# Patient Record
Sex: MALE | Race: BLACK OR AFRICAN AMERICAN | NOT HISPANIC OR LATINO | URBAN - METROPOLITAN AREA
[De-identification: names, ages, dates, MRNs, and addresses within clinical notes are randomized per-mention and may not be internally consistent; named-entity substitution may affect disease eponyms.]

---

## 2019-02-21 PROBLEM — Z00.00 ENCOUNTER FOR PREVENTIVE HEALTH EXAMINATION: Status: ACTIVE | Noted: 2019-02-21

## 2019-03-15 ENCOUNTER — OUTPATIENT (OUTPATIENT)
Dept: OUTPATIENT SERVICES | Facility: HOSPITAL | Age: 59
LOS: 1 days | End: 2019-03-15
Payer: MEDICAID

## 2019-03-15 ENCOUNTER — APPOINTMENT (OUTPATIENT)
Dept: UROLOGY | Facility: CLINIC | Age: 59
End: 2019-03-15

## 2019-03-15 DIAGNOSIS — H40.9 UNSPECIFIED GLAUCOMA: ICD-10-CM

## 2019-03-15 DIAGNOSIS — Z78.9 OTHER SPECIFIED HEALTH STATUS: ICD-10-CM

## 2019-03-15 DIAGNOSIS — E78.5 HYPERLIPIDEMIA, UNSPECIFIED: ICD-10-CM

## 2019-03-15 DIAGNOSIS — I10 ESSENTIAL (PRIMARY) HYPERTENSION: ICD-10-CM

## 2019-03-15 DIAGNOSIS — Z72.0 TOBACCO USE: ICD-10-CM

## 2019-03-15 PROCEDURE — 51798 US URINE CAPACITY MEASURE: CPT

## 2019-03-15 PROCEDURE — G0463: CPT | Mod: 25

## 2019-03-15 RX ORDER — LISINOPRIL 20 MG/1
20 TABLET ORAL
Refills: 0 | Status: ACTIVE | COMMUNITY

## 2019-03-15 RX ORDER — HYDROCHLOROTHIAZIDE 12.5 MG/1
12.5 CAPSULE ORAL
Refills: 0 | Status: ACTIVE | COMMUNITY

## 2019-03-15 RX ORDER — ASPIRIN 81 MG
81 TABLET, DELAYED RELEASE (ENTERIC COATED) ORAL
Refills: 0 | Status: ACTIVE | COMMUNITY

## 2019-03-15 RX ORDER — NIFEDIPINE 60 MG
60 TABLET, EXTENDED RELEASE ORAL
Refills: 0 | Status: ACTIVE | COMMUNITY

## 2019-03-15 RX ORDER — QUETIAPINE 200 MG/1
200 TABLET, FILM COATED ORAL
Refills: 0 | Status: ACTIVE | COMMUNITY

## 2019-03-15 RX ORDER — HALOPERIDOL DECANOATE 100 MG/ML
100 INJECTION INTRAMUSCULAR
Refills: 0 | Status: ACTIVE | COMMUNITY

## 2019-03-15 RX ORDER — SIMVASTATIN 20 MG/1
20 TABLET, FILM COATED ORAL
Refills: 0 | Status: ACTIVE | COMMUNITY

## 2019-03-15 RX ORDER — DIVALPROEX SODIUM 500 MG/1
500 TABLET, EXTENDED RELEASE ORAL
Refills: 0 | Status: ACTIVE | COMMUNITY

## 2019-03-15 RX ORDER — METFORMIN HYDROCHLORIDE 500 MG/1
500 TABLET, COATED ORAL
Refills: 0 | Status: ACTIVE | COMMUNITY

## 2019-03-15 RX ORDER — FENOFIBRATE 145 MG/1
2 TABLET ORAL
Refills: 0 | Status: ACTIVE | COMMUNITY

## 2019-03-15 NOTE — HISTORY OF PRESENT ILLNESS
[FreeTextEntry1] : 58M hx HTN, HLD, DM, MR here for evaluation for incomplete emptying and nocturia. Pt endorses some LUTS for several months now. Denies hesitancy, dribbling, dysuria, hematuria. Endorses incomplete emptying, nocturia x3. Reports urinating 3-4x a day. Denies caffeine and EtOH intake. States he drinks plenty of water. \par \par Denies family hx of prostate CA\par \par outside US 1/2019: 30cc prostate, PVR 26cc\par outside PSA (12/2018): 1.23

## 2019-03-15 NOTE — PHYSICAL EXAM
[General Appearance - Well Nourished] : well nourished [Normal Appearance] : normal appearance [Edema] : no peripheral edema [Abdomen Soft] : soft [Abdomen Tenderness] : non-tender [Abdomen Mass (___ Cm)] : no abdominal mass palpated [Penis Abnormality] : normal circumcised penis [Scrotum] : the scrotum was normal [Epididymis] : the epididymides were normal [Testes Tenderness] : no tenderness of the testes [Testes Mass (___cm)] : there were no testicular masses [Prostate Tenderness] : the prostate was not tender [Prostate Size ___ gm] : prostate size [unfilled] gm [Normal Station and Gait] : the gait and station were normal for the patient's age [] : no rash [No Focal Deficits] : no focal deficits [Affect] : the affect was normal

## 2019-03-15 NOTE — ASSESSMENT
[FreeTextEntry1] : 58M hx HTN, HLD, DM, MR here for evaluation for incomplete emptying and nocturia. On US, 30cc prostate, PVR 26cc, outside PSA (12/2018): 1.23\par -- in office PVR 140cc\par -- mixed irritative and obstructive symptoms (nocturia and incomplete emptying). Pt denies sensation of incomplete emptying, although pt w/hx DM\par -- will trial on Flomax 0.4mg qHS, side effects d/w pt\par -- UA/UCx\par -- RTC 1mo re-eval LUTS

## 2019-03-26 DIAGNOSIS — E11.9 TYPE 2 DIABETES MELLITUS WITHOUT COMPLICATIONS: ICD-10-CM

## 2019-03-26 DIAGNOSIS — R35.1 NOCTURIA: ICD-10-CM

## 2019-03-26 DIAGNOSIS — R39.12 POOR URINARY STREAM: ICD-10-CM

## 2019-03-26 DIAGNOSIS — R33.9 RETENTION OF URINE, UNSPECIFIED: ICD-10-CM

## 2019-03-26 DIAGNOSIS — N40.1 BENIGN PROSTATIC HYPERPLASIA WITH LOWER URINARY TRACT SYMPTOMS: ICD-10-CM

## 2019-04-19 ENCOUNTER — APPOINTMENT (OUTPATIENT)
Dept: UROLOGY | Facility: CLINIC | Age: 59
End: 2019-04-19

## 2019-04-19 ENCOUNTER — OUTPATIENT (OUTPATIENT)
Dept: OUTPATIENT SERVICES | Facility: HOSPITAL | Age: 59
LOS: 1 days | End: 2019-04-19
Payer: MEDICAID

## 2019-04-19 DIAGNOSIS — R35.0 FREQUENCY OF MICTURITION: ICD-10-CM

## 2019-04-19 LAB
APPEARANCE: CLEAR
BACTERIA UR CULT: NORMAL
BACTERIA: NEGATIVE
BILIRUBIN URINE: NEGATIVE
BLOOD URINE: NEGATIVE
COLOR: YELLOW
GLUCOSE QUALITATIVE U: NEGATIVE
HYALINE CASTS: 0 /LPF
KETONES URINE: NEGATIVE
LEUKOCYTE ESTERASE URINE: NEGATIVE
MICROSCOPIC-UA: NORMAL
NITRITE URINE: NEGATIVE
PH URINE: 7
PROTEIN URINE: NORMAL
RED BLOOD CELLS URINE: 7 /HPF
SPECIFIC GRAVITY URINE: 1.02
SQUAMOUS EPITHELIAL CELLS: 6 /HPF
UROBILINOGEN URINE: NORMAL
WHITE BLOOD CELLS URINE: 5 /HPF

## 2019-04-19 PROCEDURE — G0463: CPT | Mod: 25

## 2019-04-19 PROCEDURE — 51798 US URINE CAPACITY MEASURE: CPT

## 2019-04-19 RX ORDER — TAMSULOSIN HYDROCHLORIDE 0.4 MG/1
0.4 CAPSULE ORAL
Qty: 30 | Refills: 6 | Status: ACTIVE | COMMUNITY
Start: 2019-03-15 | End: 1900-01-01

## 2019-04-19 NOTE — HISTORY OF PRESENT ILLNESS
[Urinary Frequency] : urinary frequency [Nocturia] : nocturia [Urinary Urgency] : urinary urgency [Hematuria - Microscopic] : microscopic hematuria [FreeTextEntry1] : 58M hx HTN, HLD, DM, MR here for evaluation for incomplete emptying and nocturia. Pt endorses some LUTS for several months now. Denies hesitancy, dribbling, dysuria, hematuria. Endorses incomplete emptying, nocturia x3. Reports urinating 3-4x a day. Denies caffeine and EtOH intake. States he drinks plenty of water. \par \par Denies family hx of prostate CA\par \par outside US 1/2019: 30cc prostate, PVR 26cc\par outside PSA (12/2018): 1.23\par \par Interval events:\par 4/19 -> Flomax markedly helping urinary symptoms.  Urination much less frequently, and feels like emptying bladder.  PVR today 40 cc.  Found to have microscopic hematuria on UA, had a recent CT scan on 3/19, will bring in results next visit. [Urinary Retention] : no urinary retention [Straining] : no straining [Fatigue] : no fatigue [Nausea] : no nausea

## 2019-04-19 NOTE — PHYSICAL EXAM
[Abdomen Soft] : soft [General Appearance - Well Nourished] : well nourished [Normal Appearance] : normal appearance [Abdomen Mass (___ Cm)] : no abdominal mass palpated [Abdomen Tenderness] : non-tender [Edema] : no peripheral edema [] : no respiratory distress [Affect] : the affect was normal [No Focal Deficits] : no focal deficits [Normal Station and Gait] : the gait and station were normal for the patient's age

## 2019-04-19 NOTE — ASSESSMENT
[FreeTextEntry1] : 58M hx HTN, HLD, DM, MR here for evaluation for incomplete emptying and nocturia. On US, 30cc prostate, PVR 26cc, outside PSA (12/2018): 1.23\par \par -- in office PVR 40 cc on 4/19\par -- Renew Flomax 0.4mg qHS\par -- Return to clinic in 1 month for cystoscopy for microscopic hematuria\par -- Will review patient's recent CT scan from 3/19 with and without contrast for evaluation of upper tracts

## 2019-04-29 DIAGNOSIS — R31.29 OTHER MICROSCOPIC HEMATURIA: ICD-10-CM

## 2019-04-29 DIAGNOSIS — R33.9 RETENTION OF URINE, UNSPECIFIED: ICD-10-CM

## 2019-04-29 DIAGNOSIS — R39.12 POOR URINARY STREAM: ICD-10-CM

## 2019-04-29 DIAGNOSIS — R35.1 NOCTURIA: ICD-10-CM

## 2019-04-29 DIAGNOSIS — R39.15 URGENCY OF URINATION: ICD-10-CM

## 2019-04-29 DIAGNOSIS — E11.9 TYPE 2 DIABETES MELLITUS WITHOUT COMPLICATIONS: ICD-10-CM

## 2019-05-09 ENCOUNTER — RESULT REVIEW (OUTPATIENT)
Age: 59
End: 2019-05-09

## 2019-05-10 ENCOUNTER — OUTPATIENT (OUTPATIENT)
Dept: OUTPATIENT SERVICES | Facility: HOSPITAL | Age: 59
LOS: 1 days | End: 2019-05-10
Payer: MEDICAID

## 2019-05-10 ENCOUNTER — APPOINTMENT (OUTPATIENT)
Dept: UROLOGY | Facility: CLINIC | Age: 59
End: 2019-05-10

## 2019-05-10 DIAGNOSIS — R35.0 FREQUENCY OF MICTURITION: ICD-10-CM

## 2019-05-10 PROCEDURE — 88112 CYTOPATH CELL ENHANCE TECH: CPT | Mod: 26

## 2019-05-10 PROCEDURE — 88112 CYTOPATH CELL ENHANCE TECH: CPT

## 2019-05-10 PROCEDURE — G0463: CPT | Mod: 25

## 2019-05-10 PROCEDURE — 52000 CYSTOURETHROSCOPY: CPT

## 2019-05-10 RX ORDER — FINASTERIDE 5 MG/1
5 TABLET, FILM COATED ORAL DAILY
Qty: 30 | Refills: 5 | Status: ACTIVE | COMMUNITY
Start: 2019-05-10 | End: 1900-01-01

## 2019-05-10 RX ORDER — TAMSULOSIN HYDROCHLORIDE 0.4 MG/1
0.4 CAPSULE ORAL
Qty: 30 | Refills: 5 | Status: ACTIVE | COMMUNITY
Start: 2019-05-10 | End: 1900-01-01

## 2019-05-16 DIAGNOSIS — R31.29 OTHER MICROSCOPIC HEMATURIA: ICD-10-CM

## 2019-08-16 ENCOUNTER — OUTPATIENT (OUTPATIENT)
Dept: OUTPATIENT SERVICES | Facility: HOSPITAL | Age: 59
LOS: 1 days | End: 2019-08-16
Payer: MEDICAID

## 2019-08-16 ENCOUNTER — APPOINTMENT (OUTPATIENT)
Dept: UROLOGY | Facility: CLINIC | Age: 59
End: 2019-08-16

## 2019-08-16 DIAGNOSIS — R30.0 DYSURIA: ICD-10-CM

## 2019-08-16 DIAGNOSIS — R31.0 GROSS HEMATURIA: ICD-10-CM

## 2019-08-16 DIAGNOSIS — Z87.440 PERSONAL HISTORY OF URINARY (TRACT) INFECTIONS: ICD-10-CM

## 2019-08-16 PROCEDURE — G0463: CPT

## 2019-08-16 RX ORDER — CEPHALEXIN 500 MG/1
500 CAPSULE ORAL TWICE DAILY
Qty: 10 | Refills: 0 | Status: ACTIVE | COMMUNITY
Start: 2019-08-16 | End: 1900-01-01

## 2019-08-16 NOTE — PHYSICAL EXAM
[General Appearance - In No Acute Distress] : no acute distress [General Appearance - Well Nourished] : well nourished [Normal Appearance] : normal appearance [Abdomen Soft] : soft [Abdomen Tenderness] : non-tender [Abdomen Mass (___ Cm)] : no abdominal mass palpated [Penis Abnormality] : normal circumcised penis [Urinary Bladder Findings] : the bladder was normal on palpation [Epididymis] : the epididymides were normal [Testes Mass (___cm)] : there were no testicular masses [Testes Tenderness] : no tenderness of the testes [] : no rash [Edema] : no peripheral edema [Exaggerated Use Of Accessory Muscles For Inspiration] : no accessory muscle use [Affect] : the affect was normal [Normal Station and Gait] : the gait and station were normal for the patient's age [No Focal Deficits] : no focal deficits

## 2019-08-16 NOTE — HISTORY OF PRESENT ILLNESS
[Urinary Urgency] : urinary urgency [Nocturia] : nocturia [Urinary Frequency] : urinary frequency [Hematuria - Gross] : gross hematuria [Urinary Retention] : no urinary retention [FreeTextEntry1] : 58M hx HTN, HLD, DM, MR followed in the clinic for microscopic hematuria.  Recent work up included a negative CT urogram, negative urine cytology, and a cystoscopy only showing an enlarged prostate.  \par \par Patient was set to RTC in November, but presents for f/u from recent hospitalization at [facility] on 8/3/19.  From caretaker, patient fell and was hypotensive.  CT scan obtained at [facility] that showed 400 cc in bladder, mildly enlarged prostate, and evidence of an esophagitis s/p full upper and lower endoscopy.  Endoscopy just revealed esophagitis.  Had a marquez placed in the hospital for the retention but was subsequently removed prior to discharge.  Since then, has been voiding more frequently than usual and having noticeable hematuria as appreciable from her caretakers.\par \par PVR today 40-60 cc [Hematuria - Microscopic] : no microscopic hematuria [Straining] : no straining [Fever] : no fever [Fatigue] : no fatigue [Nausea] : no nausea

## 2019-08-16 NOTE — ASSESSMENT
[Urinary Tract Infection (599.0\N39.0)] : qualitative ~C was positive [FreeTextEntry1] : 58M hx HTN, HLD, DM, MR followed in the clinic for microscopic hematuria.  Recent work up included a negative CT urogram, negative urine cytology, and a cystoscopy only showing an enlarged prostate.  \par \par Presents for f/u from recent hospitalization at [facility] on 8/3/19.  From caretaker, patient fell and was hypotensive.  CT scan obtained at [facility] that showed 400 cc in bladder, mildly enlarged prostate, and evidence of an esophagitis s/p full upper and lower endoscopy.  Endoscopy just revealed esophagitis.  Had a marquez placed in the hospital for the retention but was subsequently removed prior to discharge. Since then, has been voiding more frequently than usual and having noticeable hematuria as appreciable from her caretakers.\par \par Urine today is light pink in color.  Suspect likely UTI related to recent catheterization and hematuria either due to irritation from BPH and catheter vs UTI.\par \par -- UA, UCx today\par -- Rx Keflex for 1 week\par -- RTC in 1 wk for re-assesment

## 2019-08-19 LAB
APPEARANCE: CLEAR
BACTERIA UR CULT: NORMAL
BACTERIA: NEGATIVE
BILIRUBIN URINE: NEGATIVE
BLOOD URINE: ABNORMAL
COLOR: ABNORMAL
GLUCOSE QUALITATIVE U: NEGATIVE
HYALINE CASTS: 0 /LPF
KETONES URINE: NEGATIVE
LEUKOCYTE ESTERASE URINE: NEGATIVE
MICROSCOPIC-UA: NORMAL
NITRITE URINE: NEGATIVE
PH URINE: 7.5
PROTEIN URINE: ABNORMAL
RED BLOOD CELLS URINE: 2 /HPF
SPECIFIC GRAVITY URINE: 1.02
SQUAMOUS EPITHELIAL CELLS: 3 /HPF
UROBILINOGEN URINE: NORMAL
WHITE BLOOD CELLS URINE: 1 /HPF

## 2019-09-03 DIAGNOSIS — R31.0 GROSS HEMATURIA: ICD-10-CM

## 2019-09-03 DIAGNOSIS — N39.0 URINARY TRACT INFECTION, SITE NOT SPECIFIED: ICD-10-CM

## 2019-09-03 DIAGNOSIS — R35.0 FREQUENCY OF MICTURITION: ICD-10-CM

## 2019-09-06 ENCOUNTER — APPOINTMENT (OUTPATIENT)
Dept: UROLOGY | Facility: CLINIC | Age: 59
End: 2019-09-06

## 2019-09-06 ENCOUNTER — OUTPATIENT (OUTPATIENT)
Dept: OUTPATIENT SERVICES | Facility: HOSPITAL | Age: 59
LOS: 1 days | End: 2019-09-06
Payer: MEDICAID

## 2019-09-06 DIAGNOSIS — E11.9 TYPE 2 DIABETES MELLITUS W/OUT COMPLICATIONS: ICD-10-CM

## 2019-09-06 DIAGNOSIS — N39.0 URINARY TRACT INFECTION, SITE NOT SPECIFIED: ICD-10-CM

## 2019-09-06 DIAGNOSIS — R35.0 FREQUENCY OF MICTURITION: ICD-10-CM

## 2019-09-06 DIAGNOSIS — E11.9 TYPE 2 DIABETES MELLITUS WITHOUT COMPLICATIONS: ICD-10-CM

## 2019-09-06 DIAGNOSIS — N40.0 BENIGN PROSTATIC HYPERPLASIA WITHOUT LOWER URINARY TRACT SYMPTOMS: ICD-10-CM

## 2019-09-06 PROCEDURE — 51798 US URINE CAPACITY MEASURE: CPT

## 2019-09-06 PROCEDURE — G0463: CPT | Mod: 25

## 2019-09-06 NOTE — PHYSICAL EXAM
[General Appearance - Well Developed] : well developed [Normal Appearance] : normal appearance [General Appearance - Well Nourished] : well nourished [Well Groomed] : well groomed [Abdomen Soft] : soft [General Appearance - In No Acute Distress] : no acute distress [Costovertebral Angle Tenderness] : no ~M costovertebral angle tenderness [Abdomen Tenderness] : non-tender [] : no respiratory distress [Edema] : no peripheral edema [Exaggerated Use Of Accessory Muscles For Inspiration] : no accessory muscle use [Respiration, Rhythm And Depth] : normal respiratory rhythm and effort [Affect] : the affect was normal [Mood] : the mood was normal [Oriented To Time, Place, And Person] : oriented to person, place, and time [Normal Station and Gait] : the gait and station were normal for the patient's age [Not Anxious] : not anxious [No Focal Deficits] : no focal deficits [No Palpable Adenopathy] : no palpable adenopathy

## 2019-09-06 NOTE — ASSESSMENT
[FreeTextEntry1] : Pt is a 59 yo male with improving urinary symptoms following keflex treatment, urine culture was negative.\par - Patient to follow up in Nov for repeat UA.\par - If patient unable to void, patient to go to ER for evaluation.

## 2019-09-06 NOTE — HISTORY OF PRESENT ILLNESS
[FreeTextEntry1] : Patient seen in clinic 3 weeks prior for urinary frequency and urgency following catherization during hosptial stay. Patient states symptoms have improved since taking keflex and denies current urinary symptoms. Patient voided urine today is clear yellow without any evidence of bleeding. \par Patient has a history of microscopic hematuria. \par \par  \par \par PVR today 80 cc

## 2019-09-09 ENCOUNTER — APPOINTMENT (OUTPATIENT)
Dept: PULMONOLOGY | Facility: CLINIC | Age: 59
End: 2019-09-09

## 2019-10-11 ENCOUNTER — OUTPATIENT (OUTPATIENT)
Dept: OUTPATIENT SERVICES | Facility: HOSPITAL | Age: 59
LOS: 1 days | End: 2019-10-11
Payer: MEDICAID

## 2019-10-11 ENCOUNTER — APPOINTMENT (OUTPATIENT)
Dept: UROLOGY | Facility: CLINIC | Age: 59
End: 2019-10-11

## 2019-10-11 DIAGNOSIS — R35.0 FREQUENCY OF MICTURITION: ICD-10-CM

## 2019-10-11 DIAGNOSIS — N40.0 BENIGN PROSTATIC HYPERPLASIA WITHOUT LOWER URINARY TRACT SYMPMS: ICD-10-CM

## 2019-10-11 DIAGNOSIS — R33.9 RETENTION OF URINE, UNSPECIFIED: ICD-10-CM

## 2019-10-11 DIAGNOSIS — R31.29 OTHER MICROSCOPIC HEMATURIA: ICD-10-CM

## 2019-10-11 DIAGNOSIS — R35.1 NOCTURIA: ICD-10-CM

## 2019-10-11 DIAGNOSIS — N40.0 BENIGN PROSTATIC HYPERPLASIA WITHOUT LOWER URINARY TRACT SYMPTOMS: ICD-10-CM

## 2019-10-11 DIAGNOSIS — R39.15 URGENCY OF URINATION: ICD-10-CM

## 2019-10-11 DIAGNOSIS — R39.12 POOR URINARY STREAM: ICD-10-CM

## 2019-10-11 PROCEDURE — G0463: CPT

## 2019-10-11 NOTE — PHYSICAL EXAM
[General Appearance - Well Developed] : well developed [General Appearance - Well Nourished] : well nourished [General Appearance - In No Acute Distress] : no acute distress [Abdomen Soft] : soft [Abdomen Tenderness] : non-tender [Costovertebral Angle Tenderness] : no ~M costovertebral angle tenderness [Urethral Meatus] : meatus normal [Urinary Bladder Findings] : the bladder was normal on palpation [Scrotum] : the scrotum was normal [Testes Mass (___cm)] : there were no testicular masses [Skin Color & Pigmentation] : normal skin color and pigmentation [] : no respiratory distress [No Palpable Adenopathy] : no palpable adenopathy [Inguinal Lymph Nodes Enlarged Bilaterally] : inguinal

## 2019-10-14 LAB
APPEARANCE: CLEAR
BACTERIA: NEGATIVE
BILIRUBIN URINE: NEGATIVE
BLOOD URINE: NEGATIVE
COLOR: YELLOW
GLUCOSE QUALITATIVE U: NEGATIVE
HYALINE CASTS: 0 /LPF
KETONES URINE: NEGATIVE
LEUKOCYTE ESTERASE URINE: NEGATIVE
MICROSCOPIC-UA: NORMAL
NITRITE URINE: NEGATIVE
PH URINE: 7
PROTEIN URINE: NEGATIVE
RED BLOOD CELLS URINE: 1 /HPF
SPECIFIC GRAVITY URINE: 1.02
SQUAMOUS EPITHELIAL CELLS: 1 /HPF
UROBILINOGEN URINE: ABNORMAL
WHITE BLOOD CELLS URINE: 1 /HPF

## 2019-10-14 NOTE — HISTORY OF PRESENT ILLNESS
[FreeTextEntry1] : this is a 60 y/o male here for f/u for urinary sx and repeat UA.  Pt last seen sept 6th 2019 and pt had sx resolved from taking keflex.  pt has nocturia x3, urgency, not strong stream but "ok", no incomplete emptying, no frequency, dysuria, hematuria.  Pt reports weight gain, no SOB, no f/c, has regular bowel movements, happy with urinary sx now. Otherwise feeling well.\par \par

## 2019-10-14 NOTE — ASSESSMENT
[FreeTextEntry1] : his is a 58 y/o male here for f/u for urinary sx and repeat UA\par \par RTC in 6 mo

## 2019-10-14 NOTE — REVIEW OF SYSTEMS
[see HPI] : see HPI [Nocturia] : nocturia [Negative] : Constitutional [Fever] : no fever [Chills] : no chills [Feeling Poorly] : not feeling poorly [Feeling Tired] : not feeling tired [Chest Pain] : no chest pain [Palpitations] : no palpitations [Shortness Of Breath] : no shortness of breath [Cough] : no cough [Wheezing] : no wheezing [SOB on Exertion] : no shortness of breath during exertion [Abdominal Pain] : no abdominal pain [Vomiting] : no vomiting [Constipation] : no constipation [Dysuria] : no dysuria [Incontinence] : no incontinence [Hesitancy] : no urinary hesitancy [Genital Lesion] : no genital lesions [Testicular Pain] : no testicular pain

## 2020-02-07 DIAGNOSIS — G89.29 PAIN IN RIGHT KNEE: ICD-10-CM

## 2020-02-07 DIAGNOSIS — M25.561 PAIN IN RIGHT KNEE: ICD-10-CM

## 2020-02-12 ENCOUNTER — OUTPATIENT (OUTPATIENT)
Dept: OUTPATIENT SERVICES | Facility: HOSPITAL | Age: 60
LOS: 1 days | End: 2020-02-12

## 2020-02-12 ENCOUNTER — APPOINTMENT (OUTPATIENT)
Dept: ORTHOPEDIC SURGERY | Facility: HOSPITAL | Age: 60
End: 2020-02-12

## 2020-02-12 VITALS
DIASTOLIC BLOOD PRESSURE: 78 MMHG | HEART RATE: 72 BPM | BODY MASS INDEX: 25.15 KG/M2 | WEIGHT: 196 LBS | SYSTOLIC BLOOD PRESSURE: 119 MMHG | HEIGHT: 74 IN

## 2020-02-12 DIAGNOSIS — G95.9 DISEASE OF SPINAL CORD, UNSPECIFIED: ICD-10-CM

## 2020-02-13 DIAGNOSIS — G25.9 EXTRAPYRAMIDAL AND MOVEMENT DISORDER, UNSPECIFIED: ICD-10-CM

## 2020-02-13 DIAGNOSIS — M79.604 PAIN IN RIGHT LEG: ICD-10-CM

## 2020-05-05 RX ORDER — FINASTERIDE 5 MG/1
5 TABLET, FILM COATED ORAL DAILY
Qty: 30 | Refills: 1 | Status: ACTIVE | COMMUNITY
Start: 2019-05-10 | End: 1900-01-01

## 2020-05-05 RX ORDER — TAMSULOSIN HYDROCHLORIDE 0.4 MG/1
0.4 CAPSULE ORAL
Qty: 30 | Refills: 1 | Status: ACTIVE | COMMUNITY
Start: 2019-05-10 | End: 1900-01-01

## 2020-06-05 ENCOUNTER — APPOINTMENT (OUTPATIENT)
Dept: UROLOGY | Facility: CLINIC | Age: 60
End: 2020-06-05

## 2020-12-21 PROBLEM — Z87.440 HISTORY OF URINARY TRACT INFECTION: Status: RESOLVED | Noted: 2019-08-16 | Resolved: 2020-12-21

## 2022-02-11 ENCOUNTER — OUTPATIENT (OUTPATIENT)
Dept: OUTPATIENT SERVICES | Facility: HOSPITAL | Age: 62
LOS: 1 days | End: 2022-02-11
Payer: MEDICAID

## 2022-02-11 VITALS
RESPIRATION RATE: 16 BRPM | WEIGHT: 177.03 LBS | HEART RATE: 65 BPM | SYSTOLIC BLOOD PRESSURE: 158 MMHG | DIASTOLIC BLOOD PRESSURE: 83 MMHG | TEMPERATURE: 98 F | HEIGHT: 74 IN | OXYGEN SATURATION: 99 %

## 2022-02-11 DIAGNOSIS — K05.6 PERIODONTAL DISEASE, UNSPECIFIED: ICD-10-CM

## 2022-02-11 DIAGNOSIS — K02.62 DENTAL CARIES ON SMOOTH SURFACE PENETRATING INTO DENTIN: ICD-10-CM

## 2022-02-11 DIAGNOSIS — Z98.890 OTHER SPECIFIED POSTPROCEDURAL STATES: Chronic | ICD-10-CM

## 2022-02-11 DIAGNOSIS — Z01.818 ENCOUNTER FOR OTHER PREPROCEDURAL EXAMINATION: ICD-10-CM

## 2022-02-11 LAB
ANION GAP SERPL CALC-SCNC: 14 MMOL/L — SIGNIFICANT CHANGE UP (ref 5–17)
BUN SERPL-MCNC: 8 MG/DL — SIGNIFICANT CHANGE UP (ref 7–23)
CALCIUM SERPL-MCNC: 9.1 MG/DL — SIGNIFICANT CHANGE UP (ref 8.4–10.5)
CHLORIDE SERPL-SCNC: 97 MMOL/L — SIGNIFICANT CHANGE UP (ref 96–108)
CO2 SERPL-SCNC: 23 MMOL/L — SIGNIFICANT CHANGE UP (ref 22–31)
CREAT SERPL-MCNC: 0.9 MG/DL — SIGNIFICANT CHANGE UP (ref 0.5–1.3)
GLUCOSE SERPL-MCNC: 83 MG/DL — SIGNIFICANT CHANGE UP (ref 70–99)
HCT VFR BLD CALC: 32.9 % — LOW (ref 39–50)
HGB BLD-MCNC: 10.7 G/DL — LOW (ref 13–17)
MCHC RBC-ENTMCNC: 25.3 PG — LOW (ref 27–34)
MCHC RBC-ENTMCNC: 32.5 GM/DL — SIGNIFICANT CHANGE UP (ref 32–36)
MCV RBC AUTO: 77.8 FL — LOW (ref 80–100)
NRBC # BLD: 0 /100 WBCS — SIGNIFICANT CHANGE UP (ref 0–0)
PLATELET # BLD AUTO: 89 K/UL — LOW (ref 150–400)
POTASSIUM SERPL-MCNC: 3.6 MMOL/L — SIGNIFICANT CHANGE UP (ref 3.5–5.3)
POTASSIUM SERPL-SCNC: 3.6 MMOL/L — SIGNIFICANT CHANGE UP (ref 3.5–5.3)
RBC # BLD: 4.23 M/UL — SIGNIFICANT CHANGE UP (ref 4.2–5.8)
RBC # FLD: 15.9 % — HIGH (ref 10.3–14.5)
SODIUM SERPL-SCNC: 134 MMOL/L — LOW (ref 135–145)
WBC # BLD: 5.27 K/UL — SIGNIFICANT CHANGE UP (ref 3.8–10.5)
WBC # FLD AUTO: 5.27 K/UL — SIGNIFICANT CHANGE UP (ref 3.8–10.5)

## 2022-02-11 PROCEDURE — 80048 BASIC METABOLIC PNL TOTAL CA: CPT

## 2022-02-11 PROCEDURE — G0463: CPT

## 2022-02-11 PROCEDURE — 85027 COMPLETE CBC AUTOMATED: CPT

## 2022-02-11 PROCEDURE — 36415 COLL VENOUS BLD VENIPUNCTURE: CPT

## 2022-02-11 RX ORDER — SODIUM CHLORIDE 9 MG/ML
1000 INJECTION, SOLUTION INTRAVENOUS
Refills: 0 | Status: DISCONTINUED | OUTPATIENT
Start: 2022-02-25 | End: 2022-03-11

## 2022-02-11 RX ORDER — SODIUM CHLORIDE 9 MG/ML
3 INJECTION INTRAMUSCULAR; INTRAVENOUS; SUBCUTANEOUS EVERY 8 HOURS
Refills: 0 | Status: DISCONTINUED | OUTPATIENT
Start: 2022-02-25 | End: 2022-03-11

## 2022-02-11 NOTE — H&P PST ADULT - PRO ARRIVE FROM
[facility] (lives in support apartment)--accompanied by medical coordinator/home/group home [facility] (lives in supportive apartment)--accompanied by medical coordinator/home/group home

## 2022-02-11 NOTE — H&P PST ADULT - NSANTHOSAYNRD_GEN_A_CORE
No. RAYMUNDO screening performed.  STOP BANG Legend: 0-2 = LOW Risk; 3-4 = INTERMEDIATE Risk; 5-8 = HIGH Risk

## 2022-02-11 NOTE — H&P PST ADULT - FALL HARM RISK - UNIVERSAL INTERVENTIONS
Bed in lowest position, wheels locked, appropriate side rails in place/Call bell, personal items and telephone in reach/Instruct patient to call for assistance before getting out of bed or chair/Non-slip footwear when patient is out of bed/[place] to call system/Physically safe environment - no spills, clutter or unnecessary equipment/Purposeful Proactive Rounding/Room/bathroom lighting operational, light cord in reach

## 2022-02-11 NOTE — H&P PST ADULT - NSICDXPASTMEDICALHX_GEN_ALL_CORE_FT
PAST MEDICAL HISTORY:  Autism     H/O: glaucoma     History of BPH     HLD (hyperlipidemia)     HTN (hypertension)      PAST MEDICAL HISTORY:  Autism     Current every day smoker     H/O: glaucoma     History of BPH     HLD (hyperlipidemia)     HTN (hypertension)

## 2022-02-11 NOTE — H&P PST ADULT - HISTORY OF PRESENT ILLNESS
61 year old male with PMH HTN, T2DM, HLD, DM Neuropathy, Glaucoma, BPH, Autism    preop covid swab 2/23 @ [facility] 61 year old male with PMH of current tobacco use, HTN, HLD, Glaucoma, BPH, Autism, with Periodontal Disease who presents to [facility] for evaluation prior to scheduled Comprehensive Dental Treatment under Anesthesia 2/25/2022.    preop covid swab 2/23 1:30 @ [facility] (booked at [facility])

## 2022-02-11 NOTE — H&P PST ADULT - PROBLEM SELECTOR PLAN 1
Comprehensive Dental Treatement under Anesthesia  -cbc, bmp done at [facility] [place]  -medical evaluation  -preop instructions given to group home staff member Comprehensive Dental Treatement under Anesthesia  -cbc, bmp done at [facility]  -medical evaluation  -advised to avoid smoking day of surgery  -preop instructions given to group home staff member

## 2022-02-11 NOTE — H&P PST ADULT - NEUROLOGICAL DETAILS
responds to pain/responds to verbal commands alert and oriented x 3/responds to pain/responds to verbal commands

## 2022-02-17 PROBLEM — Z86.69 PERSONAL HISTORY OF OTHER DISEASES OF THE NERVOUS SYSTEM AND SENSE ORGANS: Chronic | Status: ACTIVE | Noted: 2022-02-11

## 2022-02-17 PROBLEM — E78.5 HYPERLIPIDEMIA, UNSPECIFIED: Chronic | Status: ACTIVE | Noted: 2022-02-11

## 2022-02-17 PROBLEM — I10 ESSENTIAL (PRIMARY) HYPERTENSION: Chronic | Status: ACTIVE | Noted: 2022-02-11

## 2022-02-17 PROBLEM — F17.200 NICOTINE DEPENDENCE, UNSPECIFIED, UNCOMPLICATED: Chronic | Status: ACTIVE | Noted: 2022-02-11

## 2022-02-17 PROBLEM — F84.0 AUTISTIC DISORDER: Chronic | Status: ACTIVE | Noted: 2022-02-11

## 2022-02-17 PROBLEM — Z87.438 PERSONAL HISTORY OF OTHER DISEASES OF MALE GENITAL ORGANS: Chronic | Status: ACTIVE | Noted: 2022-02-11

## 2022-02-23 ENCOUNTER — OUTPATIENT (OUTPATIENT)
Dept: OUTPATIENT SERVICES | Facility: HOSPITAL | Age: 62
LOS: 1 days | End: 2022-02-23
Payer: MEDICAID

## 2022-02-23 DIAGNOSIS — Z11.52 ENCOUNTER FOR SCREENING FOR COVID-19: ICD-10-CM

## 2022-02-23 DIAGNOSIS — Z98.890 OTHER SPECIFIED POSTPROCEDURAL STATES: Chronic | ICD-10-CM

## 2022-02-23 LAB — SARS-COV-2 RNA SPEC QL NAA+PROBE: SIGNIFICANT CHANGE UP

## 2022-02-23 PROCEDURE — U0005: CPT

## 2022-02-23 PROCEDURE — U0003: CPT

## 2022-02-23 PROCEDURE — C9803: CPT

## 2022-02-24 ENCOUNTER — TRANSCRIPTION ENCOUNTER (OUTPATIENT)
Age: 62
End: 2022-02-24

## 2022-02-25 ENCOUNTER — OUTPATIENT (OUTPATIENT)
Dept: OUTPATIENT SERVICES | Facility: HOSPITAL | Age: 62
LOS: 1 days | End: 2022-02-25
Payer: MEDICAID

## 2022-02-25 VITALS
TEMPERATURE: 97 F | HEART RATE: 69 BPM | RESPIRATION RATE: 16 BRPM | OXYGEN SATURATION: 100 % | WEIGHT: 177.03 LBS | DIASTOLIC BLOOD PRESSURE: 84 MMHG | SYSTOLIC BLOOD PRESSURE: 152 MMHG | HEIGHT: 74 IN

## 2022-02-25 VITALS
RESPIRATION RATE: 18 BRPM | HEART RATE: 65 BPM | SYSTOLIC BLOOD PRESSURE: 162 MMHG | OXYGEN SATURATION: 100 % | TEMPERATURE: 98 F | DIASTOLIC BLOOD PRESSURE: 96 MMHG

## 2022-02-25 DIAGNOSIS — Z01.818 ENCOUNTER FOR OTHER PREPROCEDURAL EXAMINATION: ICD-10-CM

## 2022-02-25 DIAGNOSIS — K02.62 DENTAL CARIES ON SMOOTH SURFACE PENETRATING INTO DENTIN: ICD-10-CM

## 2022-02-25 DIAGNOSIS — Z98.890 OTHER SPECIFIED POSTPROCEDURAL STATES: Chronic | ICD-10-CM

## 2022-02-25 DIAGNOSIS — K05.6 PERIODONTAL DISEASE, UNSPECIFIED: ICD-10-CM

## 2022-02-25 PROCEDURE — 41874 ALVEOLOPLASTY EACH QUADRANT: CPT

## 2022-02-25 PROCEDURE — D7210: CPT

## 2022-02-25 PROCEDURE — C1889: CPT

## 2022-02-25 DEVICE — SURGICEL 2 X 14": Type: IMPLANTABLE DEVICE | Status: FUNCTIONAL

## 2022-02-25 RX ORDER — ONDANSETRON 8 MG/1
4 TABLET, FILM COATED ORAL ONCE
Refills: 0 | Status: DISCONTINUED | OUTPATIENT
Start: 2022-02-25 | End: 2022-03-11

## 2022-02-25 RX ORDER — FENTANYL CITRATE 50 UG/ML
25 INJECTION INTRAVENOUS
Refills: 0 | Status: DISCONTINUED | OUTPATIENT
Start: 2022-02-25 | End: 2022-02-25

## 2022-02-25 RX ADMIN — SODIUM CHLORIDE 100 MILLILITER(S): 9 INJECTION, SOLUTION INTRAVENOUS at 13:28

## 2022-02-25 RX ADMIN — SODIUM CHLORIDE 3 MILLILITER(S): 9 INJECTION INTRAMUSCULAR; INTRAVENOUS; SUBCUTANEOUS at 13:29

## 2022-02-25 NOTE — ASU DISCHARGE PLAN (ADULT/PEDIATRIC) - NURSING INSTRUCTIONS
Next dose of Tylenol will be on or after _______8:30pm____ ,today/tonight and every 6 hours afterwards for pain management, do not take any Tylenol containing products until this time. Your first dose of Tylenol was given at ______2:30pm_____. Do not exceed more than 4000mg of Tylenol in one 24 hour setting. No straws for 2 days. Eat soft cold food; no seeds/no rice. No smoking.     Next dose of Tylenol will be on or after _______8:30pm____ ,today/tonight and every 6 hours afterwards for pain management, do not take any Tylenol containing products until this time. Your first dose of Tylenol was given at ______2:30pm_____. Do not exceed more than 4000mg of Tylenol in one 24 hour setting.

## 2022-02-25 NOTE — ASU PATIENT PROFILE, ADULT - FALL HARM RISK - UNIVERSAL INTERVENTIONS
Bed in lowest position, wheels locked, appropriate side rails in place/Call bell, personal items and telephone in reach/Instruct patient to call for assistance before getting out of bed or chair/Non-slip footwear when patient is out of bed/[place] to call system/Physically safe environment - no spills, clutter or unnecessary equipment/Purposeful Proactive Rounding/Room/bathroom lighting operational, light cord in reach

## 2022-02-25 NOTE — ASU DISCHARGE PLAN (ADULT/PEDIATRIC) - NS MD DC FALL RISK RISK
For information on Fall & Injury Prevention, visit: [email or web address] OR  [email or web address] OR  https://www.cdc.gov/steadi/patient.html

## 2022-02-25 NOTE — ASU PATIENT PROFILE, ADULT - PRO ARRIVE FROM
[facility] (lives in supportive apartment)--accompanied by medical coordinator/home/group home [facility] (lives in supportive apartment)--accompanied by medical coordinator/assisted living facility

## 2022-02-25 NOTE — ASU DISCHARGE PLAN (ADULT/PEDIATRIC) - ASU DC SPECIAL INSTRUCTIONSFT
no straw for two days and keep head elevated for the next two days and nights     see Dr Julio in one week on 3/1 at 10:45 [place] [facility] [place], appointment is made if you need to change it call [phone]    resume all medications and return to program on Sunday    tylenol prn pain no straw for two days and keep head elevated for the next two days and nights     extractions performed as medically necessary     clindamycin was prescribed from DR Narayan office     see Dr Julio in one week on 3/1 at 10:45 at [facility] in [place], appointment is made if you need to change it call [phone]    resume all medications and return to program on Sunday    tylenol prn pain

## 2022-02-25 NOTE — ASU PATIENT PROFILE, ADULT - NSICDXPASTMEDICALHX_GEN_ALL_CORE_FT
PAST MEDICAL HISTORY:  Autism     Current every day smoker     H/O: glaucoma     History of BPH     HLD (hyperlipidemia)     HTN (hypertension)

## 2022-09-02 ENCOUNTER — OUTPATIENT (OUTPATIENT)
Dept: OUTPATIENT SERVICES | Facility: HOSPITAL | Age: 62
LOS: 1 days | End: 2022-09-02
Payer: MEDICAID

## 2022-09-02 ENCOUNTER — RESULT REVIEW (OUTPATIENT)
Age: 62
End: 2022-09-02

## 2022-09-02 ENCOUNTER — APPOINTMENT (OUTPATIENT)
Dept: INTERVENTIONAL RADIOLOGY/VASCULAR | Facility: HOSPITAL | Age: 62
End: 2022-09-02

## 2022-09-02 ENCOUNTER — TRANSCRIPTION ENCOUNTER (OUTPATIENT)
Age: 62
End: 2022-09-02

## 2022-09-02 VITALS
DIASTOLIC BLOOD PRESSURE: 92 MMHG | SYSTOLIC BLOOD PRESSURE: 156 MMHG | RESPIRATION RATE: 17 BRPM | TEMPERATURE: 98 F | OXYGEN SATURATION: 99 % | HEART RATE: 61 BPM

## 2022-09-02 VITALS
RESPIRATION RATE: 18 BRPM | OXYGEN SATURATION: 99 % | DIASTOLIC BLOOD PRESSURE: 86 MMHG | SYSTOLIC BLOOD PRESSURE: 149 MMHG | HEART RATE: 70 BPM | TEMPERATURE: 98 F

## 2022-09-02 DIAGNOSIS — Z98.890 OTHER SPECIFIED POSTPROCEDURAL STATES: Chronic | ICD-10-CM

## 2022-09-02 DIAGNOSIS — D70.9 NEUTROPENIA, UNSPECIFIED: ICD-10-CM

## 2022-09-02 DIAGNOSIS — D69.6 THROMBOCYTOPENIA, UNSPECIFIED: ICD-10-CM

## 2022-09-02 DIAGNOSIS — D64.9 ANEMIA, UNSPECIFIED: ICD-10-CM

## 2022-09-02 LAB
GLUCOSE BLDC GLUCOMTR-MCNC: 105 MG/DL — HIGH (ref 70–99)
GLUCOSE BLDC GLUCOMTR-MCNC: 90 MG/DL — SIGNIFICANT CHANGE UP (ref 70–99)

## 2022-09-02 PROCEDURE — 88305 TISSUE EXAM BY PATHOLOGIST: CPT

## 2022-09-02 PROCEDURE — 88305 TISSUE EXAM BY PATHOLOGIST: CPT | Mod: 26

## 2022-09-02 PROCEDURE — 88341 IMHCHEM/IMCYTCHM EA ADD ANTB: CPT | Mod: 26,59

## 2022-09-02 PROCEDURE — 87205 SMEAR GRAM STAIN: CPT

## 2022-09-02 PROCEDURE — 88360 TUMOR IMMUNOHISTOCHEM/MANUAL: CPT | Mod: 26,59

## 2022-09-02 PROCEDURE — 88184 FLOWCYTOMETRY/ TC 1 MARKER: CPT

## 2022-09-02 PROCEDURE — 88313 SPECIAL STAINS GROUP 2: CPT | Mod: 26

## 2022-09-02 PROCEDURE — 88342 IMHCHEM/IMCYTCHM 1ST ANTB: CPT | Mod: 26,59

## 2022-09-02 PROCEDURE — 81450 HL NEO GSAP 5-50DNA/DNA&RNA: CPT

## 2022-09-02 PROCEDURE — 88291 CYTO/MOLECULAR REPORT: CPT

## 2022-09-02 PROCEDURE — 38222 DX BONE MARROW BX & ASPIR: CPT

## 2022-09-02 PROCEDURE — 88360 TUMOR IMMUNOHISTOCHEM/MANUAL: CPT

## 2022-09-02 PROCEDURE — 77012 CT SCAN FOR NEEDLE BIOPSY: CPT

## 2022-09-02 PROCEDURE — 88275 CYTOGENETICS 100-300: CPT

## 2022-09-02 PROCEDURE — 85097 BONE MARROW INTERPRETATION: CPT

## 2022-09-02 PROCEDURE — 88342 IMHCHEM/IMCYTCHM 1ST ANTB: CPT

## 2022-09-02 PROCEDURE — 88271 CYTOGENETICS DNA PROBE: CPT

## 2022-09-02 PROCEDURE — 88264 CHROMOSOME ANALYSIS 20-25: CPT

## 2022-09-02 PROCEDURE — 88189 FLOWCYTOMETRY/READ 16 & >: CPT

## 2022-09-02 PROCEDURE — 88341 IMHCHEM/IMCYTCHM EA ADD ANTB: CPT

## 2022-09-02 PROCEDURE — 88313 SPECIAL STAINS GROUP 2: CPT

## 2022-09-02 PROCEDURE — 82962 GLUCOSE BLOOD TEST: CPT

## 2022-09-02 PROCEDURE — 88237 TISSUE CULTURE BONE MARROW: CPT

## 2022-09-02 PROCEDURE — 77012 CT SCAN FOR NEEDLE BIOPSY: CPT | Mod: 26

## 2022-09-02 PROCEDURE — 88185 FLOWCYTOMETRY/TC ADD-ON: CPT

## 2022-09-02 PROCEDURE — 88280 CHROMOSOME KARYOTYPE STUDY: CPT

## 2022-09-02 RX ORDER — HYDRALAZINE HCL 50 MG
0 TABLET ORAL
Qty: 0 | Refills: 0 | DISCHARGE

## 2022-09-02 RX ORDER — FENTANYL CITRATE 50 UG/ML
25 INJECTION INTRAVENOUS
Refills: 0 | Status: DISCONTINUED | OUTPATIENT
Start: 2022-09-02 | End: 2022-09-02

## 2022-09-02 RX ORDER — FERROUS SULFATE 325(65) MG
0 TABLET ORAL
Qty: 0 | Refills: 0 | DISCHARGE

## 2022-09-02 RX ORDER — QUETIAPINE FUMARATE 200 MG/1
1 TABLET, FILM COATED ORAL
Qty: 0 | Refills: 0 | DISCHARGE

## 2022-09-02 RX ORDER — DIVALPROEX SODIUM 500 MG/1
2 TABLET, DELAYED RELEASE ORAL
Qty: 0 | Refills: 0 | DISCHARGE

## 2022-09-02 RX ORDER — HALOPERIDOL DECANOATE 100 MG/ML
0.8 INJECTION INTRAMUSCULAR
Qty: 0 | Refills: 0 | DISCHARGE

## 2022-09-02 RX ORDER — ASPIRIN/CALCIUM CARB/MAGNESIUM 324 MG
0 TABLET ORAL
Qty: 0 | Refills: 0 | DISCHARGE

## 2022-09-02 RX ORDER — LABETALOL HCL 100 MG
1 TABLET ORAL
Qty: 0 | Refills: 0 | DISCHARGE
Start: 2022-09-02

## 2022-09-02 RX ORDER — ASPIRIN/CALCIUM CARB/MAGNESIUM 324 MG
0 TABLET ORAL
Qty: 0 | Refills: 0 | DISCHARGE
Start: 2022-09-02

## 2022-09-02 RX ORDER — ONDANSETRON 8 MG/1
4 TABLET, FILM COATED ORAL ONCE
Refills: 0 | Status: DISCONTINUED | OUTPATIENT
Start: 2022-09-02 | End: 2022-09-02

## 2022-09-02 RX ORDER — DOCUSATE SODIUM 100 MG
3 CAPSULE ORAL
Qty: 0 | Refills: 0 | DISCHARGE

## 2022-09-02 RX ORDER — FENTANYL CITRATE 50 UG/ML
50 INJECTION INTRAVENOUS
Refills: 0 | Status: DISCONTINUED | OUTPATIENT
Start: 2022-09-02 | End: 2022-09-02

## 2022-09-02 RX ORDER — LABETALOL HCL 100 MG
1 TABLET ORAL
Qty: 0 | Refills: 0 | DISCHARGE

## 2022-09-02 RX ORDER — HYDRALAZINE HCL 50 MG
1 TABLET ORAL
Qty: 0 | Refills: 0 | DISCHARGE
Start: 2022-09-02

## 2022-09-02 RX ORDER — BRIMONIDINE TARTRATE, TIMOLOL MALEATE 2; 5 MG/ML; MG/ML
1 SOLUTION/ DROPS OPHTHALMIC
Qty: 0 | Refills: 0 | DISCHARGE

## 2022-09-02 RX ORDER — FOLIC ACID 0.8 MG
1 TABLET ORAL
Qty: 0 | Refills: 0 | DISCHARGE

## 2022-09-02 RX ORDER — SODIUM CHLORIDE 9 MG/ML
1000 INJECTION, SOLUTION INTRAVENOUS
Refills: 0 | Status: DISCONTINUED | OUTPATIENT
Start: 2022-09-02 | End: 2022-09-02

## 2022-09-02 RX ORDER — POLYETHYLENE GLYCOL 3350 17 G/17G
0 POWDER, FOR SOLUTION ORAL
Qty: 0 | Refills: 0 | DISCHARGE

## 2022-09-02 RX ORDER — NIFEDIPINE 30 MG
1 TABLET, EXTENDED RELEASE 24 HR ORAL
Qty: 0 | Refills: 0 | DISCHARGE

## 2022-09-02 RX ORDER — FINASTERIDE 5 MG/1
1 TABLET, FILM COATED ORAL
Qty: 0 | Refills: 0 | DISCHARGE

## 2022-09-02 RX ORDER — BENZTROPINE MESYLATE 1 MG
1 TABLET ORAL
Qty: 0 | Refills: 0 | DISCHARGE

## 2022-09-02 RX ORDER — SIMVASTATIN 20 MG/1
1 TABLET, FILM COATED ORAL
Qty: 0 | Refills: 0 | DISCHARGE

## 2022-09-02 RX ORDER — TAMSULOSIN HYDROCHLORIDE 0.4 MG/1
0 CAPSULE ORAL
Qty: 0 | Refills: 0 | DISCHARGE
Start: 2022-09-02

## 2022-09-02 RX ORDER — SIMVASTATIN 20 MG/1
1 TABLET, FILM COATED ORAL
Qty: 0 | Refills: 0 | DISCHARGE
Start: 2022-09-02

## 2022-09-02 RX ORDER — ACETAMINOPHEN 500 MG
1000 TABLET ORAL ONCE
Refills: 0 | Status: DISCONTINUED | OUTPATIENT
Start: 2022-09-02 | End: 2022-09-02

## 2022-09-02 RX ORDER — FINASTERIDE 5 MG/1
1 TABLET, FILM COATED ORAL
Qty: 0 | Refills: 0 | DISCHARGE
Start: 2022-09-02

## 2022-09-02 RX ORDER — LATANOPROST 0.05 MG/ML
1 SOLUTION/ DROPS OPHTHALMIC; TOPICAL
Qty: 0 | Refills: 0 | DISCHARGE
Start: 2022-09-02

## 2022-09-02 RX ORDER — MAGNESIUM OXIDE 400 MG ORAL TABLET 241.3 MG
1 TABLET ORAL
Qty: 0 | Refills: 0 | DISCHARGE
Start: 2022-09-02

## 2022-09-02 RX ORDER — FOLIC ACID 0.8 MG
1 TABLET ORAL
Qty: 0 | Refills: 0 | DISCHARGE
Start: 2022-09-02

## 2022-09-02 RX ORDER — MAGNESIUM OXIDE 400 MG ORAL TABLET 241.3 MG
1 TABLET ORAL
Qty: 0 | Refills: 0 | DISCHARGE

## 2022-09-02 RX ORDER — DOCUSATE SODIUM 100 MG
0 CAPSULE ORAL
Qty: 0 | Refills: 0 | DISCHARGE

## 2022-09-02 RX ORDER — HYDRALAZINE HCL 50 MG
10 TABLET ORAL ONCE
Refills: 0 | Status: DISCONTINUED | OUTPATIENT
Start: 2022-09-02 | End: 2022-09-02

## 2022-09-02 RX ORDER — LANOLIN/MINERAL OIL
0 LOTION (ML) TOPICAL
Qty: 0 | Refills: 0 | DISCHARGE

## 2022-09-02 RX ORDER — FERROUS SULFATE 325(65) MG
1 TABLET ORAL
Qty: 0 | Refills: 0 | DISCHARGE
Start: 2022-09-02

## 2022-09-02 RX ORDER — LATANOPROST 0.05 MG/ML
1 SOLUTION/ DROPS OPHTHALMIC; TOPICAL
Qty: 0 | Refills: 0 | DISCHARGE

## 2022-09-02 RX ORDER — TAMSULOSIN HYDROCHLORIDE 0.4 MG/1
0 CAPSULE ORAL
Qty: 0 | Refills: 0 | DISCHARGE

## 2022-09-02 RX ORDER — NIFEDIPINE 30 MG
1 TABLET, EXTENDED RELEASE 24 HR ORAL
Qty: 0 | Refills: 0 | DISCHARGE
Start: 2022-09-02

## 2022-09-02 RX ORDER — LISINOPRIL 2.5 MG/1
0 TABLET ORAL
Qty: 0 | Refills: 0 | DISCHARGE

## 2022-09-02 RX ORDER — LISINOPRIL 2.5 MG/1
1 TABLET ORAL
Qty: 0 | Refills: 0 | DISCHARGE
Start: 2022-09-02

## 2022-09-02 NOTE — ASU DISCHARGE PLAN (ADULT/PEDIATRIC) - NS MD DC FALL RISK RISK
For information on Fall & Injury Prevention, visit: [email or web address] OR  [email or web address] OR  https://www.cdc.gov/steadi/patient.html

## 2022-09-06 LAB — TM INTERPRETATION: SIGNIFICANT CHANGE UP

## 2022-09-07 LAB — SURGICAL PATHOLOGY STUDY: SIGNIFICANT CHANGE UP

## 2022-09-08 LAB — CHROM ANALY INTERPHASE BLD FISH-IMP: SIGNIFICANT CHANGE UP

## 2022-09-12 LAB — ONKOSIGHT MYELOID SEQUENCE: SIGNIFICANT CHANGE UP

## 2022-09-15 LAB — CHROM ANALY OVERALL INTERP SPEC-IMP: SIGNIFICANT CHANGE UP

## 2023-06-20 ENCOUNTER — EMERGENCY (EMERGENCY)
Facility: HOSPITAL | Age: 63
LOS: 0 days | Discharge: HOME | End: 2023-06-20
Attending: STUDENT IN AN ORGANIZED HEALTH CARE EDUCATION/TRAINING PROGRAM
Payer: MEDICAID

## 2023-06-20 VITALS
WEIGHT: 199.96 LBS | TEMPERATURE: 98 F | DIASTOLIC BLOOD PRESSURE: 69 MMHG | HEIGHT: 75 IN | SYSTOLIC BLOOD PRESSURE: 126 MMHG | RESPIRATION RATE: 19 BRPM | HEART RATE: 55 BPM | OXYGEN SATURATION: 100 %

## 2023-06-20 VITALS
SYSTOLIC BLOOD PRESSURE: 142 MMHG | RESPIRATION RATE: 16 BRPM | HEART RATE: 65 BPM | OXYGEN SATURATION: 99 % | DIASTOLIC BLOOD PRESSURE: 78 MMHG

## 2023-06-20 DIAGNOSIS — M62.838 OTHER MUSCLE SPASM: ICD-10-CM

## 2023-06-20 DIAGNOSIS — R25.3 FASCICULATION: ICD-10-CM

## 2023-06-20 DIAGNOSIS — F17.200 NICOTINE DEPENDENCE, UNSPECIFIED, UNCOMPLICATED: ICD-10-CM

## 2023-06-20 DIAGNOSIS — Z98.890 OTHER SPECIFIED POSTPROCEDURAL STATES: Chronic | ICD-10-CM

## 2023-06-20 LAB
ANION GAP SERPL CALC-SCNC: 3 MMOL/L — LOW (ref 5–17)
BASOPHILS # BLD AUTO: 0 K/UL — SIGNIFICANT CHANGE UP (ref 0–0.2)
BASOPHILS NFR BLD AUTO: 0 % — SIGNIFICANT CHANGE UP (ref 0–2)
BUN SERPL-MCNC: 15 MG/DL — SIGNIFICANT CHANGE UP (ref 7–23)
CALCIUM SERPL-MCNC: 10.2 MG/DL — HIGH (ref 8.5–10.1)
CHLORIDE SERPL-SCNC: 104 MMOL/L — SIGNIFICANT CHANGE UP (ref 96–108)
CO2 SERPL-SCNC: 26 MMOL/L — SIGNIFICANT CHANGE UP (ref 22–31)
CREAT SERPL-MCNC: 1.64 MG/DL — HIGH (ref 0.5–1.3)
EGFR: 47 ML/MIN/1.73M2 — LOW
EOSINOPHIL # BLD AUTO: 0.06 K/UL — SIGNIFICANT CHANGE UP (ref 0–0.5)
EOSINOPHIL NFR BLD AUTO: 1 % — SIGNIFICANT CHANGE UP (ref 0–6)
GLUCOSE SERPL-MCNC: 95 MG/DL — SIGNIFICANT CHANGE UP (ref 70–99)
HCT VFR BLD CALC: 35.5 % — LOW (ref 39–50)
HGB BLD-MCNC: 12.4 G/DL — LOW (ref 13–17)
LYMPHOCYTES # BLD AUTO: 2.5 K/UL — SIGNIFICANT CHANGE UP (ref 1–3.3)
LYMPHOCYTES # BLD AUTO: 44 % — SIGNIFICANT CHANGE UP (ref 13–44)
MANUAL SMEAR VERIFICATION: SIGNIFICANT CHANGE UP
MCHC RBC-ENTMCNC: 25.7 PG — LOW (ref 27–34)
MCHC RBC-ENTMCNC: 34.9 G/DL — SIGNIFICANT CHANGE UP (ref 32–36)
MCV RBC AUTO: 73.7 FL — LOW (ref 80–100)
MICROCYTES BLD QL: SLIGHT — SIGNIFICANT CHANGE UP
MONOCYTES # BLD AUTO: 0.51 K/UL — SIGNIFICANT CHANGE UP (ref 0–0.9)
MONOCYTES NFR BLD AUTO: 9 % — SIGNIFICANT CHANGE UP (ref 2–14)
NEUTROPHILS # BLD AUTO: 2.61 K/UL — SIGNIFICANT CHANGE UP (ref 1.8–7.4)
NEUTROPHILS NFR BLD AUTO: 46 % — SIGNIFICANT CHANGE UP (ref 43–77)
NRBC # BLD: 0 /100 — SIGNIFICANT CHANGE UP (ref 0–0)
NRBC # BLD: SIGNIFICANT CHANGE UP /100 WBCS (ref 0–0)
PLAT MORPH BLD: NORMAL — SIGNIFICANT CHANGE UP
PLATELET # BLD AUTO: 255 K/UL — SIGNIFICANT CHANGE UP (ref 150–400)
POTASSIUM SERPL-MCNC: 3.8 MMOL/L — SIGNIFICANT CHANGE UP (ref 3.5–5.3)
POTASSIUM SERPL-SCNC: 3.8 MMOL/L — SIGNIFICANT CHANGE UP (ref 3.5–5.3)
RBC # BLD: 4.82 M/UL — SIGNIFICANT CHANGE UP (ref 4.2–5.8)
RBC # FLD: 15.3 % — HIGH (ref 10.3–14.5)
RBC BLD AUTO: SIGNIFICANT CHANGE UP
SODIUM SERPL-SCNC: 133 MMOL/L — LOW (ref 135–145)
WBC # BLD: 5.68 K/UL — SIGNIFICANT CHANGE UP (ref 3.8–10.5)
WBC # FLD AUTO: 5.68 K/UL — SIGNIFICANT CHANGE UP (ref 3.8–10.5)

## 2023-06-20 PROCEDURE — 70450 CT HEAD/BRAIN W/O DYE: CPT | Mod: 26,MA

## 2023-06-20 PROCEDURE — 99284 EMERGENCY DEPT VISIT MOD MDM: CPT

## 2023-06-20 NOTE — ED ADULT NURSE NOTE - OBJECTIVE STATEMENT
Pt BIB Caretaker for left leg spasm and tongue deviation after going out to smoke a cigarette. Denies CP, SOB, ,n/v/d and is afebrile and denies pain. Currently pt no longer has leg spasms or tongue deviation. Normally uses a cane to ambulate. Speech is clear. Will continue to monitor pt.

## 2023-06-20 NOTE — ED PROVIDER NOTE - OBJECTIVE STATEMENT
62-year-old male smoker presenting to the ED accompanied by caregiver with complaints of noted tongue fasciculations and right lower extremity leg spasms while outside smoking.  Patient denies any current complaints denies any numbness slurred speech weakness spasms chest pain shortness of breath.

## 2023-06-20 NOTE — ED PROVIDER NOTE - PATIENT PORTAL LINK FT
You can access the FollowMyHealth Patient Portal offered by [facility] by registering at the following website: [email or web address]. By joining [employer]’s FollowMyHealth portal, you will also be able to view your health information using other applications (apps) compatible with our system.

## 2023-06-20 NOTE — ED ADULT TRIAGE NOTE - CHIEF COMPLAINT QUOTE
from [facility] accompanied by caregiver, as per caregiver, patient went for a smoke this morning and then around 0900 he looked short of breath and the nurse in [facility] noticed patient having involuntary left leg spasms and tongue was on the side, patient states he is back to normal and denies any complaints, denies sob right now, denies any pain, denies n/v/dizziness, denies blurry vision, denies weakness, no slurred speech, no facial droop noted.

## 2023-06-20 NOTE — ED ADULT NURSE NOTE - NSFALLUNIVINTERV_ED_ALL_ED
Bed/Stretcher in lowest position, wheels locked, appropriate side rails in place/Call bell, personal items and telephone in reach/Instruct patient to call for assistance before getting out of bed/chair/stretcher/Non-slip footwear applied when patient is off stretcher/[place] to call system/Physically safe environment - no spills, clutter or unnecessary equipment/Purposeful proactive rounding/Room/bathroom lighting operational, light cord in reach

## 2023-06-20 NOTE — ED PROVIDER NOTE - PHYSICAL EXAMINATION
VITAL SIGNS: I have reviewed nursing notes and confirm.  CONSTITUTIONAL: well-appearing, non-toxic, NAD  SKIN: Warm dry, normal skin turgor  HEAD: NCAT  CARD: RRR, no murmurs, rubs or gallops  RESP: clear to ausculation b/l.  No rales, rhonchi, or wheezing.  ABD: soft, + BS, non-tender, non-distended, no rebound or guarding. No CVA tenderness  EXT: Full ROM, no bony tenderness, no pedal edema, no calf tenderness  NEURO: normal motor. normal sensory. CN II-XII intact. Cerebellar testing normal. Normal gait.  PSYCH: Cooperative, appropriate.

## 2023-06-20 NOTE — ED ADULT NURSE NOTE - NSHOSCREENINGQ1_ED_ALL_ED
Preliminary Beta strep group A r/o culture is PENDING and/or NEGATIVE at this time.   No changes in treatment per Strep culture protocol. No

## 2023-06-20 NOTE — ED PROVIDER NOTE - CLINICAL SUMMARY MEDICAL DECISION MAKING FREE TEXT BOX
62-year-old male smoker presenting to the ED accompanied by caregiver with complaints of noted tongue fasciculations and right lower extremity leg spasms while outside smoking.  Patient denies any current complaints denies any numbness slurred speech weakness spasms chest pain shortness of breath.    pt w/ reports of tongue fasciculations and leg spasm   unremarkable w/u   CT head w/ no acute pathology   f/u pcp   return precautions

## 2024-10-18 ENCOUNTER — INPATIENT (INPATIENT)
Facility: HOSPITAL | Age: 64
LOS: 16 days | Discharge: HOME HEALTH | End: 2024-11-04
Attending: INTERNAL MEDICINE | Admitting: INTERNAL MEDICINE
Payer: MEDICARE

## 2024-10-18 VITALS
HEIGHT: 75 IN | TEMPERATURE: 97 F | RESPIRATION RATE: 18 BRPM | HEART RATE: 52 BPM | SYSTOLIC BLOOD PRESSURE: 122 MMHG | OXYGEN SATURATION: 99 % | DIASTOLIC BLOOD PRESSURE: 75 MMHG | WEIGHT: 199.96 LBS

## 2024-10-18 DIAGNOSIS — F31.9 BIPOLAR DISORDER, UNSPECIFIED: ICD-10-CM

## 2024-10-18 DIAGNOSIS — Z01.818 ENCOUNTER FOR OTHER PREPROCEDURAL EXAMINATION: ICD-10-CM

## 2024-10-18 DIAGNOSIS — E78.5 HYPERLIPIDEMIA, UNSPECIFIED: ICD-10-CM

## 2024-10-18 DIAGNOSIS — E87.1 HYPO-OSMOLALITY AND HYPONATREMIA: ICD-10-CM

## 2024-10-18 DIAGNOSIS — I10 ESSENTIAL (PRIMARY) HYPERTENSION: ICD-10-CM

## 2024-10-18 DIAGNOSIS — N40.0 BENIGN PROSTATIC HYPERPLASIA WITHOUT LOWER URINARY TRACT SYMPTOMS: ICD-10-CM

## 2024-10-18 DIAGNOSIS — S72.141A DISPLACED INTERTROCHANTERIC FRACTURE OF RIGHT FEMUR, INITIAL ENCOUNTER FOR CLOSED FRACTURE: ICD-10-CM

## 2024-10-18 DIAGNOSIS — Z98.890 OTHER SPECIFIED POSTPROCEDURAL STATES: Chronic | ICD-10-CM

## 2024-10-18 LAB
ABO RH CONFIRMATION: SIGNIFICANT CHANGE UP
ACANTHOCYTES BLD QL SMEAR: SLIGHT — SIGNIFICANT CHANGE UP
ALBUMIN SERPL ELPH-MCNC: 3.3 G/DL — SIGNIFICANT CHANGE UP (ref 3.3–5)
ALP SERPL-CCNC: 61 U/L — SIGNIFICANT CHANGE UP (ref 40–120)
ALT FLD-CCNC: 28 U/L — SIGNIFICANT CHANGE UP (ref 12–78)
ANION GAP SERPL CALC-SCNC: 8 MMOL/L — SIGNIFICANT CHANGE UP (ref 5–17)
ANION GAP SERPL CALC-SCNC: 9 MMOL/L — SIGNIFICANT CHANGE UP (ref 5–17)
ANISOCYTOSIS BLD QL: SLIGHT — SIGNIFICANT CHANGE UP
APTT BLD: 31.4 SEC — SIGNIFICANT CHANGE UP (ref 24.5–35.6)
AST SERPL-CCNC: 31 U/L — SIGNIFICANT CHANGE UP (ref 15–37)
BASOPHILS # BLD AUTO: 0.04 K/UL — SIGNIFICANT CHANGE UP (ref 0–0.2)
BASOPHILS NFR BLD AUTO: 0.4 % — SIGNIFICANT CHANGE UP (ref 0–2)
BILIRUB SERPL-MCNC: 0.3 MG/DL — SIGNIFICANT CHANGE UP (ref 0.2–1.2)
BLD GP AB SCN SERPL QL: SIGNIFICANT CHANGE UP
BUN SERPL-MCNC: 9 MG/DL — SIGNIFICANT CHANGE UP (ref 7–23)
BUN SERPL-MCNC: 9 MG/DL — SIGNIFICANT CHANGE UP (ref 7–23)
BURR CELLS BLD QL SMEAR: PRESENT — SIGNIFICANT CHANGE UP
BURR CELLS BLD QL SMEAR: SLIGHT — SIGNIFICANT CHANGE UP
CALCIUM SERPL-MCNC: 8.6 MG/DL — SIGNIFICANT CHANGE UP (ref 8.5–10.1)
CALCIUM SERPL-MCNC: 9.1 MG/DL — SIGNIFICANT CHANGE UP (ref 8.5–10.1)
CHLORIDE SERPL-SCNC: 95 MMOL/L — LOW (ref 96–108)
CHLORIDE SERPL-SCNC: 97 MMOL/L — SIGNIFICANT CHANGE UP (ref 96–108)
CO2 SERPL-SCNC: 24 MMOL/L — SIGNIFICANT CHANGE UP (ref 22–31)
CO2 SERPL-SCNC: 25 MMOL/L — SIGNIFICANT CHANGE UP (ref 22–31)
CREAT SERPL-MCNC: 0.73 MG/DL — SIGNIFICANT CHANGE UP (ref 0.5–1.3)
CREAT SERPL-MCNC: 0.86 MG/DL — SIGNIFICANT CHANGE UP (ref 0.5–1.3)
EGFR: 101 ML/MIN/1.73M2 — SIGNIFICANT CHANGE UP
EGFR: 96 ML/MIN/1.73M2 — SIGNIFICANT CHANGE UP
ELLIPTOCYTES BLD QL SMEAR: SLIGHT — SIGNIFICANT CHANGE UP
EOSINOPHIL # BLD AUTO: 0.04 K/UL — SIGNIFICANT CHANGE UP (ref 0–0.5)
EOSINOPHIL NFR BLD AUTO: 0.4 % — SIGNIFICANT CHANGE UP (ref 0–6)
GLUCOSE SERPL-MCNC: 111 MG/DL — HIGH (ref 70–99)
GLUCOSE SERPL-MCNC: 114 MG/DL — HIGH (ref 70–99)
HCT VFR BLD CALC: 31.8 % — LOW (ref 39–50)
HGB BLD-MCNC: 10.7 G/DL — LOW (ref 13–17)
HYPOCHROMIA BLD QL: SLIGHT — SIGNIFICANT CHANGE UP
IMM GRANULOCYTES NFR BLD AUTO: 0.4 % — SIGNIFICANT CHANGE UP (ref 0–0.9)
INR BLD: 1.21 RATIO — HIGH (ref 0.85–1.16)
LG PLATELETS BLD QL AUTO: SLIGHT — SIGNIFICANT CHANGE UP
LYMPHOCYTES # BLD AUTO: 1.28 K/UL — SIGNIFICANT CHANGE UP (ref 1–3.3)
LYMPHOCYTES # BLD AUTO: 11.3 % — LOW (ref 13–44)
MANUAL SMEAR VERIFICATION: SIGNIFICANT CHANGE UP
MCHC RBC-ENTMCNC: 24.7 PG — LOW (ref 27–34)
MCHC RBC-ENTMCNC: 33.6 G/DL — SIGNIFICANT CHANGE UP (ref 32–36)
MCV RBC AUTO: 73.3 FL — LOW (ref 80–100)
MICROCYTES BLD QL: SLIGHT — SIGNIFICANT CHANGE UP
MONOCYTES # BLD AUTO: 0.87 K/UL — SIGNIFICANT CHANGE UP (ref 0–0.9)
MONOCYTES NFR BLD AUTO: 7.7 % — SIGNIFICANT CHANGE UP (ref 2–14)
NEUTROPHILS # BLD AUTO: 9.1 K/UL — HIGH (ref 1.8–7.4)
NEUTROPHILS NFR BLD AUTO: 79.8 % — HIGH (ref 43–77)
NRBC # BLD: 0 /100 WBCS — SIGNIFICANT CHANGE UP (ref 0–0)
OVALOCYTES BLD QL SMEAR: SLIGHT — SIGNIFICANT CHANGE UP
PLAT MORPH BLD: ABNORMAL
PLATELET # BLD AUTO: 257 K/UL — SIGNIFICANT CHANGE UP (ref 150–400)
PLATELET CLUMP BLD QL SMEAR: SLIGHT
POIKILOCYTOSIS BLD QL AUTO: SLIGHT — SIGNIFICANT CHANGE UP
POTASSIUM SERPL-MCNC: 3.6 MMOL/L — SIGNIFICANT CHANGE UP (ref 3.5–5.3)
POTASSIUM SERPL-MCNC: 4.3 MMOL/L — SIGNIFICANT CHANGE UP (ref 3.5–5.3)
POTASSIUM SERPL-SCNC: 3.6 MMOL/L — SIGNIFICANT CHANGE UP (ref 3.5–5.3)
POTASSIUM SERPL-SCNC: 4.3 MMOL/L — SIGNIFICANT CHANGE UP (ref 3.5–5.3)
PROT SERPL-MCNC: 7.4 GM/DL — SIGNIFICANT CHANGE UP (ref 6–8.3)
PROTHROM AB SERPL-ACNC: 13.7 SEC — HIGH (ref 9.9–13.4)
RBC # BLD: 4.34 M/UL — SIGNIFICANT CHANGE UP (ref 4.2–5.8)
RBC # FLD: 14.3 % — SIGNIFICANT CHANGE UP (ref 10.3–14.5)
RBC BLD AUTO: ABNORMAL
SMUDGE CELLS # BLD: PRESENT — SIGNIFICANT CHANGE UP
SODIUM SERPL-SCNC: 128 MMOL/L — LOW (ref 135–145)
SODIUM SERPL-SCNC: 130 MMOL/L — LOW (ref 135–145)
TARGETS BLD QL SMEAR: SLIGHT — SIGNIFICANT CHANGE UP
WBC # BLD: 11.37 K/UL — HIGH (ref 3.8–10.5)
WBC # FLD AUTO: 11.37 K/UL — HIGH (ref 3.8–10.5)

## 2024-10-18 PROCEDURE — 99221 1ST HOSP IP/OBS SF/LOW 40: CPT

## 2024-10-18 PROCEDURE — 93010 ELECTROCARDIOGRAM REPORT: CPT

## 2024-10-18 PROCEDURE — 99285 EMERGENCY DEPT VISIT HI MDM: CPT

## 2024-10-18 PROCEDURE — 71045 X-RAY EXAM CHEST 1 VIEW: CPT | Mod: 26

## 2024-10-18 PROCEDURE — 73552 X-RAY EXAM OF FEMUR 2/>: CPT | Mod: 26,RT

## 2024-10-18 PROCEDURE — 73502 X-RAY EXAM HIP UNI 2-3 VIEWS: CPT | Mod: 26,RT

## 2024-10-18 RX ORDER — TRAMADOL HYDROCHLORIDE 50 MG/1
50 TABLET, COATED ORAL EVERY 6 HOURS
Refills: 0 | Status: DISCONTINUED | OUTPATIENT
Start: 2024-10-18 | End: 2024-10-19

## 2024-10-18 RX ORDER — HYDRALAZINE HYDROCHLORIDE 50 MG/1
100 TABLET, FILM COATED ORAL THREE TIMES A DAY
Refills: 0 | Status: DISCONTINUED | OUTPATIENT
Start: 2024-10-18 | End: 2024-10-19

## 2024-10-18 RX ORDER — SODIUM CHLORIDE 5 % 5 %
500 INTRAVENOUS SOLUTION INTRAVENOUS
Refills: 0 | Status: DISCONTINUED | OUTPATIENT
Start: 2024-10-18 | End: 2024-10-18

## 2024-10-18 RX ORDER — ACETAMINOPHEN 500 MG
975 TABLET ORAL EVERY 8 HOURS
Refills: 0 | Status: DISCONTINUED | OUTPATIENT
Start: 2024-10-18 | End: 2024-10-19

## 2024-10-18 RX ORDER — SODIUM CHLORIDE 9 MG/ML
1000 INJECTION, SOLUTION INTRAMUSCULAR; INTRAVENOUS; SUBCUTANEOUS
Refills: 0 | Status: DISCONTINUED | OUTPATIENT
Start: 2024-10-18 | End: 2024-10-19

## 2024-10-18 RX ORDER — CHLORHEXIDINE GLUCONATE 40 MG/ML
1 SOLUTION TOPICAL EVERY 12 HOURS
Refills: 0 | Status: DISCONTINUED | OUTPATIENT
Start: 2024-10-18 | End: 2024-10-19

## 2024-10-18 RX ORDER — FERROUS SULFATE 325(65) MG
325 TABLET ORAL DAILY
Refills: 0 | Status: DISCONTINUED | OUTPATIENT
Start: 2024-10-18 | End: 2024-10-19

## 2024-10-18 RX ORDER — OXYCODONE HYDROCHLORIDE 30 MG/1
10 TABLET ORAL EVERY 4 HOURS
Refills: 0 | Status: DISCONTINUED | OUTPATIENT
Start: 2024-10-18 | End: 2024-10-19

## 2024-10-18 RX ORDER — FINASTERIDE 5 MG/1
5 TABLET, FILM COATED ORAL DAILY
Refills: 0 | Status: DISCONTINUED | OUTPATIENT
Start: 2024-10-18 | End: 2024-10-19

## 2024-10-18 RX ORDER — LISINOPRIL 40 MG
20 TABLET ORAL
Refills: 0 | Status: DISCONTINUED | OUTPATIENT
Start: 2024-10-18 | End: 2024-10-19

## 2024-10-18 RX ORDER — FOLIC ACID 1 MG/1
1 TABLET ORAL DAILY
Refills: 0 | Status: DISCONTINUED | OUTPATIENT
Start: 2024-10-18 | End: 2024-10-19

## 2024-10-18 RX ORDER — LATANOPROST 0.005 %
1 DROPS OPHTHALMIC (EYE) AT BEDTIME
Refills: 0 | Status: DISCONTINUED | OUTPATIENT
Start: 2024-10-18 | End: 2024-10-19

## 2024-10-18 RX ORDER — BENZTROPINE MESYLATE 0.5 MG
2 TABLET ORAL
Refills: 0 | Status: DISCONTINUED | OUTPATIENT
Start: 2024-10-18 | End: 2024-10-19

## 2024-10-18 RX ORDER — TAMSULOSIN HCL 0.4 MG
0.4 CAPSULE ORAL AT BEDTIME
Refills: 0 | Status: DISCONTINUED | OUTPATIENT
Start: 2024-10-18 | End: 2024-10-19

## 2024-10-18 RX ORDER — GLYCERIN/PROPYLENE GLYCOL 0.6 %-0.6%
1 DROPPERETTE, SINGLE-USE DROP DISPENSER OPHTHALMIC (EYE) THREE TIMES A DAY
Refills: 0 | Status: DISCONTINUED | OUTPATIENT
Start: 2024-10-18 | End: 2024-10-19

## 2024-10-18 RX ORDER — POVIDONE-IODINE 0.07 MG/ML
1 SOLUTION TOPICAL ONCE
Refills: 0 | Status: DISCONTINUED | OUTPATIENT
Start: 2024-10-18 | End: 2024-10-19

## 2024-10-18 RX ORDER — ASPIRIN/MAG CARB/ALUMINUM AMIN 325 MG
81 TABLET ORAL DAILY
Refills: 0 | Status: DISCONTINUED | OUTPATIENT
Start: 2024-10-18 | End: 2024-10-19

## 2024-10-18 RX ORDER — INFLUENZ VIR VAC TV P-SURF2003 15MCG/.5ML
0.5 SYRINGE (ML) INTRAMUSCULAR ONCE
Refills: 0 | Status: DISCONTINUED | OUTPATIENT
Start: 2024-10-18 | End: 2024-11-04

## 2024-10-18 RX ORDER — LABETALOL HCL 200 MG
300 TABLET ORAL
Refills: 0 | Status: DISCONTINUED | OUTPATIENT
Start: 2024-10-18 | End: 2024-10-19

## 2024-10-18 RX ORDER — OXYCODONE HYDROCHLORIDE 30 MG/1
5 TABLET ORAL EVERY 4 HOURS
Refills: 0 | Status: DISCONTINUED | OUTPATIENT
Start: 2024-10-18 | End: 2024-10-19

## 2024-10-18 RX ORDER — NIFEDIPINE 90 MG
60 TABLET, EXTENDED RELEASE 24 HR ORAL DAILY
Refills: 0 | Status: DISCONTINUED | OUTPATIENT
Start: 2024-10-18 | End: 2024-10-19

## 2024-10-18 RX ORDER — DIVALPROEX SODIUM 250 MG/1
1000 TABLET, FILM COATED, EXTENDED RELEASE ORAL DAILY
Refills: 0 | Status: DISCONTINUED | OUTPATIENT
Start: 2024-10-18 | End: 2024-10-19

## 2024-10-18 RX ORDER — HEPARIN SODIUM 10000 [USP'U]/ML
5000 INJECTION INTRAVENOUS; SUBCUTANEOUS ONCE
Refills: 0 | Status: COMPLETED | OUTPATIENT
Start: 2024-10-18 | End: 2024-10-18

## 2024-10-18 RX ORDER — QUETIAPINE FUMARATE 200 MG/1
200 TABLET ORAL
Refills: 0 | Status: DISCONTINUED | OUTPATIENT
Start: 2024-10-18 | End: 2024-10-19

## 2024-10-18 RX ORDER — SODIUM CHLORIDE 9 MG/ML
1000 INJECTION, SOLUTION INTRAMUSCULAR; INTRAVENOUS; SUBCUTANEOUS
Refills: 0 | Status: DISCONTINUED | OUTPATIENT
Start: 2024-10-18 | End: 2024-10-18

## 2024-10-18 RX ORDER — ACETAMINOPHEN 500 MG
975 TABLET ORAL ONCE
Refills: 0 | Status: COMPLETED | OUTPATIENT
Start: 2024-10-18 | End: 2024-10-18

## 2024-10-18 RX ADMIN — SODIUM CHLORIDE 125 MILLILITER(S): 9 INJECTION, SOLUTION INTRAMUSCULAR; INTRAVENOUS; SUBCUTANEOUS at 20:18

## 2024-10-18 RX ADMIN — Medication 1 DROP(S): at 23:27

## 2024-10-18 RX ADMIN — Medication 975 MILLIGRAM(S): at 15:13

## 2024-10-18 RX ADMIN — Medication 10 MILLIGRAM(S): at 22:06

## 2024-10-18 RX ADMIN — Medication 975 MILLIGRAM(S): at 23:00

## 2024-10-18 RX ADMIN — Medication 975 MILLIGRAM(S): at 14:13

## 2024-10-18 RX ADMIN — Medication 0.4 MILLIGRAM(S): at 22:06

## 2024-10-18 RX ADMIN — HEPARIN SODIUM 5000 UNIT(S): 10000 INJECTION INTRAVENOUS; SUBCUTANEOUS at 20:33

## 2024-10-18 RX ADMIN — Medication 975 MILLIGRAM(S): at 22:06

## 2024-10-18 RX ADMIN — HYDRALAZINE HYDROCHLORIDE 100 MILLIGRAM(S): 50 TABLET, FILM COATED ORAL at 22:06

## 2024-10-18 NOTE — H&P ADULT - ATTENDING COMMENTS
For surgical fixation of right hip intertrochanteric fracture today. The patient is aware about all other treatment options, complications, the recovery and the outcome.

## 2024-10-18 NOTE — ED PROVIDER NOTE - CLINICAL SUMMARY MEDICAL DECISION MAKING FREE TEXT BOX
63yo male with pmh autism, htn, hld, bph, presenting with R hip pain after fall.  Patient ambulatory with cane and went to open bathroom door and fell backward onto R hip.  Has been unable to walk since.  No numbness, weakness, pain elsewhere, head strike, loc, ac use.  Neurovascularly intact.  No deformities.  Will XR eval fx and reassess.

## 2024-10-18 NOTE — CONSULT NOTE ADULT - ASSESSMENT
Mark Starr is a 64 year old male with PMHx of HTN, HLD, alpha-thalassemia minor, BPH, and bipolar disorder who presented to the ED on 10/18/24 for complaints of fall and admitted for R. hip intertrochanteric fracture.    R. hip intertrochanteric fracture secondary to mechanical fall  Complaints of landing on R. hip after slipping on water on bathroom floor this AM  Denies prodromal symptoms of dizziness and lightheadedness  R. hip and femur x-rays with comminuted intertrochanteric fracture R. hip again noted  S/p acetaminophen 975 mg PO in the ED  NPO at midnight for OR in AM as per primary team  Strict bed rest, ice PRN, pain management PRN, NWB for now  DVT ppx with heparin x 1 and then SCDs as per primary team    Medical optimization  Procedure is considered intermediate risk  RCRI 0 which is class I risk, 3.9% risk of death, MI, or cardiac arrest at 30 days after noncardiac surgery  No absolute contraindications for procedure    Moderate hyponatremia, clinically euvolemic  Sodium 128 on admission, baseline~135  Medication list reviewed; not on any drugs that cause hypoNa  Recommend checking urine Na, urine Cr, urine osm, serum osm  Recommend monitoring sodium levels closely; goal correction is 6-8 mEq/L in 24 hour period, avoid overcorrection due to risks of ODS      Chronic medical conditions:  HTN: recommend to continue nifedipine 60 mg and labetalol 200 mg BID, recommend holding lisinopril 20 mg BID due to risks of electrolyte abnormalities and renal dysfunction associated with ACEi  HLD: recommend to continue simvastatin 20 mg qhs  BPH: recommend to continue tamsulosin 0.4 mg qhs, finasteride 5 mg  Bipolar disorder: recommend to continue valproate  mg 2 tabs qhs, quetiapine 200 mg BID, benztropine 2 mg BID to prevent extrapyramidal symptoms    Plan of care discussed with group home staff at bedside.

## 2024-10-18 NOTE — ED ADULT NURSE NOTE - NSFALLHARMRISKINTERV_ED_ALL_ED

## 2024-10-18 NOTE — ED ADULT NURSE NOTE - NSFALLRISKASMTTYPE_ED_ALL_ED
Head,  normocephalic,  atraumatic,  Face, within normal limits,  Ears,  External ears within normal limits,  Nose/Nasopharynx,  External nose  normal appearance, no nasal discharge,  Mouth and Throat,  Oral cavity appearance normal. Mucosa normal. Lips,  Appearance normal 
Initial (On Arrival)

## 2024-10-18 NOTE — PATIENT PROFILE ADULT - FALL HARM RISK - HARM RISK INTERVENTIONS
Communicate Risk of Fall with Harm to all staff/Discuss with provider need for PT consult/Monitor for mental status changes/Move patient closer to nurses' station/Provide patient with walking aids - walker, cane, crutches/Reinforce activity limits and safety measures with patient and family/Tailored Fall Risk Interventions/Use of alarms - bed, chair and/or voice tab/Visual Cue: Yellow wristband and red socks/Bed in lowest position, wheels locked, appropriate side rails in place/Call bell, personal items and telephone in reach/Instruct patient to call for assistance before getting out of bed or chair/Non-slip footwear when patient is out of bed/[place] to call system/Physically safe environment - no spills, clutter or unnecessary equipment/Purposeful Proactive Rounding/Room/bathroom lighting operational, light cord in reach

## 2024-10-18 NOTE — ED ADULT NURSE NOTE - OBJECTIVE STATEMENT
Patient is a 64 year old male, alert & oriented x 4 BIBEMS from an [facility] s/p fall. As per patient, he fell as he got out from the bathroom. (+) Pain to R hip. Denies hitting his head. PRS 3/10 to hip. Denies chest pain, dizziness or lightheadedness. (-) LOC. Patient was not able to walk after the fall as per Aid. PMHX: Autism, Bipolar, Smoker.

## 2024-10-18 NOTE — H&P ADULT - HISTORY OF PRESENT ILLNESS
Patient is a 64yMale ambulator with cane who presents to [facility] ED w/ a c/o of R hip pain. Pt states he tripped and fell on the way to the bathroom. Denies HT but endorses LOC. States inability to walk immediately following the injury. Denies any numbness or tingling. Denies having any other pain elsewhere. Denies orthopedic history. No other orthopedic concerns at this time.    T(C): 36.3 (10-18-24 @ 12:39), Max: 36.3 (10-18-24 @ 12:39)  HR: 52 (10-18-24 @ 12:39) (52 - 52)  BP: 122/75 (10-18-24 @ 12:39) (122/75 - 122/75)  RR: 18 (10-18-24 @ 12:39) (18 - 18)  SpO2: 99% (10-18-24 @ 12:39) (99% - 99%)        RIGHT HIP PAIN    MEWS Score    HTN (hypertension)    HLD (hyperlipidemia)    Autism    H/O: glaucoma    History of BPH    Current every day smoker    Intertrochanteric fracture of right hip    H/O colonoscopy    RIGHT HIP PAIN    90+    SysAdmin_VisitLink        Gen: NAD, Resting comfortably    RLE:  Skin intact, no erythema or ecchymosis  Externally and shortened leg  TTP by hip, nowhere else along RLE  Motor: + EHL/FHL/TA/GSC  Sensory: +SILT: SPN/DPN/ERICKSON/SAPH/TIB  Compartments soft and compressible  No calf tenderness  + DP    Secondary Assessment:  NC/AT, NTTP of clavicles, NTTP of C-,T-,L-Spine,  UEs: NTTP of Shoulders, Elbows, Wrists, Hands; NT with AROM/PROM of Shoulders, Elbows, Wrists, Hands; AIN/PIN/Med/Uln/Msc/Rad/Ax intact  LLE: Able to SLR, NT with Log Roll, NT with Heel Strike, NTTP of Hip, Knee, Ankle, foot; NT with AROM/PROM of Hip, Knee, Ankle, foot; Q/H/Gsc/TA/EHL/FHL intact    Imaging:  XR R Hip/Femur: Intertrochanteric fx. No other acute obvious fxs or dislocations.    A/P: 64yMale with R IT fx  Admit to ortho for OR tomorrow  Plan for IMN tomorrow  Patient aware of risks/benefits/alternatives of stated procedure - written consent obtained and all questions answered  Per nursing director at his group home ([place] [phone]) he makes medical decisions by himself and can sign consent  NPO except medications starting MN  IVF while NPO  One dose of SQH then hold chemical DVT ppx for surgery starting MN - use SCDs instead  Preop CBC/BMP/coags/T&S/EKG/CXR  Please document necessary medical optimizations for surgery   Pain Control As Needed   Ice hip as tolerated  NWB, Strict Bed Rest  SCDs while in bed   Medical recommendations appreciated   D/w attending - will update as needed Patient is a 64yMale ambulator with cane who presents to [facility] ED w/ a c/o of R hip pain. Pt states he slipped on water and fell on the way to the bathroom. Denies HT/LOC. States inability to walk immediately following the injury. Denies any numbness or tingling. Denies having any other pain elsewhere. Denies orthopedic history. No other orthopedic concerns at this time.    T(C): 36.3 (10-18-24 @ 12:39), Max: 36.3 (10-18-24 @ 12:39)  HR: 52 (10-18-24 @ 12:39) (52 - 52)  BP: 122/75 (10-18-24 @ 12:39) (122/75 - 122/75)  RR: 18 (10-18-24 @ 12:39) (18 - 18)  SpO2: 99% (10-18-24 @ 12:39) (99% - 99%)        RIGHT HIP PAIN    MEWS Score    HTN (hypertension)    HLD (hyperlipidemia)    Autism    H/O: glaucoma    History of BPH    Current every day smoker    Intertrochanteric fracture of right hip    H/O colonoscopy    RIGHT HIP PAIN    90+    SysAdmin_VisitLink        Gen: NAD, Resting comfortably    RLE:  Skin intact, no erythema or ecchymosis  Externally and shortened leg  TTP by hip, nowhere else along RLE  Motor: + EHL/FHL/TA/GSC  Sensory: +SILT: SPN/DPN/ERICKSON/SAPH/TIB  Compartments soft and compressible  No calf tenderness  + DP    Secondary Assessment:  NC/AT, NTTP of clavicles, NTTP of C-,T-,L-Spine,  UEs: NTTP of Shoulders, Elbows, Wrists, Hands; NT with AROM/PROM of Shoulders, Elbows, Wrists, Hands; AIN/PIN/Med/Uln/Msc/Rad/Ax intact  LLE: Able to SLR, NT with Log Roll, NT with Heel Strike, NTTP of Hip, Knee, Ankle, foot; NT with AROM/PROM of Hip, Knee, Ankle, foot; Q/H/Gsc/TA/EHL/FHL intact    Imaging:  XR R Hip/Femur: Intertrochanteric fx. No other acute obvious fxs or dislocations.    A/P: 64yMale with R IT fx  Admit to ortho for OR tomorrow  Plan for IMN tomorrow  Patient aware of risks/benefits/alternatives of stated procedure - written consent obtained and all questions answered  Per nursing director at his group home ([place] [phone]) he makes medical decisions by himself and can sign consent  NPO except medications starting MN  IVF while NPO  One dose of SQH then hold chemical DVT ppx for surgery starting MN - use SCDs instead  Preop CBC/BMP/coags/T&S/EKG/CXR  Please document necessary medical optimizations for surgery   Pain Control As Needed   Ice hip as tolerated  NWB, Strict Bed Rest  SCDs while in bed   Medical recommendations appreciated   D/w attending - will update as needed

## 2024-10-18 NOTE — CONSULT NOTE ADULT - SUBJECTIVE AND OBJECTIVE BOX
Mark Starr is a 64 year old male with PMHx of HTN, HLD, alpha-thalassemia minor, BPH, and bipolar disorder who presented to the ED on 10/18/24 for complaints of fall.    Patient reports he was going to use the bathroom this morning and there was water on the bathroom floor so he slipped on it. Landed on his right hip. Denies prodromal symptoms of dizziness and lightheadedness. Denies tongue biting. Denies head hit. Baseline functional status is ambulates with cane and independent with all ADLs. Lives at group home.    In the ED, VSS except HR as low as 52. WBC 11.37K, hgb 10.7, sodium 128. Right hip and femur x-rays with comminuted intertrochanteric fracture right hip again noted. Received acetaminophen 975 mg PO. Admitted to orthopedic surgery service. Medicine consulted for medical optimization.    Vital signs:  T(C): 36.8 (10-18-24 @ 20:22), Max: 36.8 (10-18-24 @ 20:22)  HR: 71 (10-18-24 @ 20:22) (52 - 71)  BP: 128/81 (10-18-24 @ 20:22) (122/75 - 128/81)  RR: 17 (10-18-24 @ 20:22) (17 - 18)  SpO2: 97% (10-18-24 @ 20:22) (97% - 99%)    Physical Exam:  CONSTITUTIONAL: Well groomed, no apparent distress, pleasant, conversational  EYES: PERRLA and symmetric, EOM  ENMT: Oral mucosa with moist membranes  RESP: No respiratory distress, no use of accessory muscles; CTA b/l  CV: RRR  GI: Soft, NT, ND  MSK: R. leg externally rotated and shortened, R. hip without ecchymosis or hematoma noted but +TTP                          10.7   11.37 )-----------( 257      ( 18 Oct 2024 16:50 )             31.8     128[L]  |  95[L]  |  9   ----------------------------<  114[H]     10-18  4.3   |  24  |  0.86    Ca    9.1      18 Oct 2024 16:50    TPro  7.4  /  Alb  3.3  /  TBili  0.3  /  DBili  x   /  AST  31  /  ALT  28  /  AlkPhos  61  10-18    PT/INR: 13.7/1.21 (10-18-24 @ 16:50)  PTT: 31.4 (10-18-24 @ 16:50)    Imaging:  Chest x-ray 10/18/24  IMPRESSION:   Chest unremarkable    R. femur x-ray 10/18/24  R. hip with pelvis x-ray 10/18/24  IMPRESSION:   Comminuted intertrochanteric fracture right hip again noted. Mark Starr is a 64 year old male with PMHx of HTN, HLD, alpha-thalassemia minor, BPH, and bipolar disorder who presented to the ED on 10/18/24 for complaints of fall.    Patient reports he was going to use the bathroom this morning and there was water on the bathroom floor so he slipped on it. Landed on his right hip. Denies prodromal symptoms of dizziness and lightheadedness. Denies tongue biting. Denies head hit. Baseline functional status is ambulates with cane and independent with all ADLs. Lives at group home.    In the ED, VSS except HR as low as 52. WBC 11.37K, hgb 10.7, sodium 128. Right hip and femur x-rays with comminuted intertrochanteric fracture right hip again noted. Received acetaminophen 975 mg PO. Admitted to orthopedic surgery service. Medicine consulted for medical optimization.    Vital signs:  T(C): 36.8 (10-18-24 @ 20:22), Max: 36.8 (10-18-24 @ 20:22)  HR: 71 (10-18-24 @ 20:22) (52 - 71)  BP: 128/81 (10-18-24 @ 20:22) (122/75 - 128/81)  RR: 17 (10-18-24 @ 20:22) (17 - 18)  SpO2: 97% (10-18-24 @ 20:22) (97% - 99%)    Physical Exam:  CONSTITUTIONAL: Well groomed, no apparent distress, pleasant, conversational  EYES: PERRLA and symmetric, EOM  ENMT: Oral mucosa with moist membranes  RESP: No respiratory distress, no use of accessory muscles; CTA b/l  CV: RRR  GI: Soft, NT, ND  MSK: R. leg externally rotated and shortened, R. hip limited AROM and PROM due to pain, R. hip without ecchymosis or hematoma noted but +TTP    Labs:                      10.7   11.37 )-----------( 257      ( 18 Oct 2024 16:50 )             31.8     128[L]  |  95[L]  |  9   ----------------------------<  114[H]     10-18  4.3   |  24  |  0.86    Ca    9.1      18 Oct 2024 16:50    TPro  7.4  /  Alb  3.3  /  TBili  0.3  /  DBili  x   /  AST  31  /  ALT  28  /  AlkPhos  61  10-18    PT/INR: 13.7/1.21 (10-18-24 @ 16:50)  PTT: 31.4 (10-18-24 @ 16:50)    Imaging:  Chest x-ray 10/18/24  IMPRESSION:   Chest unremarkable    R. femur x-ray 10/18/24  R. hip with pelvis x-ray 10/18/24  IMPRESSION:   Comminuted intertrochanteric fracture right hip again noted.

## 2024-10-18 NOTE — ED PROVIDER NOTE - PHYSICAL EXAMINATION
General appearance: Nontoxic appearing, conversant, afebrile    Eyes: anicteric sclerae, CHUN, EOMI   HENT: Atraumatic; oropharynx clear, MMM and no ulcerations, no pharyngeal erythema or exudate   Neck: Trachea midline; Full range of motion, supple, no midline ttp   Pulm: CTA bl, normal respiratory effort and no intercostal retractions, normal work of breathing   CV: RRR, No murmurs, rubs, or gallops   Back: No midline ttp, step offs, deformities, no cvat    Extremities: No peripheral edema, no gross deformities, FROM x4, 5/5 MS x4, gross sensation intact, ttp R hip   Skin: Dry, normal temperature, turgor and texture; no rash   Psych: Appropriate affect, cooperative

## 2024-10-18 NOTE — CONSULT NOTE ADULT - TIME BILLING
coordination of care with ER physician and orthopedic surgery resident, obtaining history, performing a physical examination, reviewing and interpreting labs and imaging, providing recommendations and medical optimization, explaining the diagnosis and treatment plan to patient and group home staff at bedside, reviewing medication list, and documentation as above.

## 2024-10-18 NOTE — CONSULT NOTE ADULT - SUBJECTIVE AND OBJECTIVE BOX
***INCOMPLETE, ACTIVE CHARTING AND PENDING FINAL PLAN***    Patient is a 64yMale ambulator with cane who presents to [facility] ED w/ a c/o of R hip pain. Pt states he tripped and fell on the way to the bathroom. Denies HT but endorses LOC. States inability to walk immediately following the injury. Denies any numbness or tingling. Denies having any other pain elsewhere. Denies orthopedic history. No other orthopedic concerns at this time.    T(C): 36.3 (10-18-24 @ 12:39), Max: 36.3 (10-18-24 @ 12:39)  HR: 52 (10-18-24 @ 12:39) (52 - 52)  BP: 122/75 (10-18-24 @ 12:39) (122/75 - 122/75)  RR: 18 (10-18-24 @ 12:39) (18 - 18)  SpO2: 99% (10-18-24 @ 12:39) (99% - 99%)        RIGHT HIP PAIN    MEWS Score    HTN (hypertension)    HLD (hyperlipidemia)    Autism    H/O: glaucoma    History of BPH    Current every day smoker    Intertrochanteric fracture of right hip    H/O colonoscopy    RIGHT HIP PAIN    90+    SysAdmin_VisitLink        Gen: NAD, Resting comfortably    RLE:  Skin intact, no erythema or ecchymosis  Externally and shortened leg  TTP by hip, nowhere else along RLE  Motor: + EHL/FHL/TA/GSC  Sensory: +SILT: SPN/DPN/ERICKSON/SAPH/TIB  Compartments soft and compressible  No calf tenderness  + DP    Secondary Assessment:  NC/AT, NTTP of clavicles, NTTP of C-,T-,L-Spine,  UEs: NTTP of Shoulders, Elbows, Wrists, Hands; NT with AROM/PROM of Shoulders, Elbows, Wrists, Hands; AIN/PIN/Med/Uln/Msc/Rad/Ax intact  LLE: Able to SLR, NT with Log Roll, NT with Heel Strike, NTTP of Hip, Knee, Ankle, foot; NT with AROM/PROM of Hip, Knee, Ankle, foot; Q/H/Gsc/TA/EHL/FHL intact    Imaging:  XR R Hip/Femur: Intertrochanteric fx. No other acute obvious fxs or dislocations.    A/P: 64yMale with R IT fx    Plan for IMN tonight vs tomorrow - pending final plan  Patient aware of risks/benefits/alternatives of stated procedure - written consent obtained and all questions answered  Per nursing director at his group home ([place] [phone]) he makes medical decisions by himself and can sign consent  NPO except medications  IVF while NPO  Hold chemical DVT ppx for surgery - use SCDs instead  Preop CBC/BMP/coags/T&S/EKG/CXR  Please document necessary medical optimizations for surgery   Pain Control As Needed   Ice hip as tolerated  NWB, Strict Bed Rest  SCDs while in bed   Medical recommendations appreciated   D/w attending - will update as needed

## 2024-10-18 NOTE — ED ADULT NURSE NOTE - ED STAT RN HANDOFF DETAILS
handoff report given to 2 e rn Trent Dorman. All pending orders endorsed, safety precautions maintained. Pt updated on plan of care.

## 2024-10-18 NOTE — ED ADULT TRIAGE NOTE - CHIEF COMPLAINT QUOTE
BIBEMS from Autism program c/o R hip pain s/p unwitnessed fall in bathroom this AM, difficulty ambulating since. PMH Bipolar, Autism.

## 2024-10-18 NOTE — ED PROVIDER NOTE - PROGRESS NOTE DETAILS
This patient was signed out to me pending evaluation by orthopedic surgery.  Orthopedic surgery did evaluate the patient and ultimately agreed to admit the patient under their service.

## 2024-10-19 ENCOUNTER — TRANSCRIPTION ENCOUNTER (OUTPATIENT)
Age: 64
End: 2024-10-19

## 2024-10-19 LAB
ANION GAP SERPL CALC-SCNC: 5 MMOL/L — SIGNIFICANT CHANGE UP (ref 5–17)
ANION GAP SERPL CALC-SCNC: 6 MMOL/L — SIGNIFICANT CHANGE UP (ref 5–17)
APTT BLD: 31.2 SEC — SIGNIFICANT CHANGE UP (ref 24.5–35.6)
BUN SERPL-MCNC: 9 MG/DL — SIGNIFICANT CHANGE UP (ref 7–23)
BUN SERPL-MCNC: 9 MG/DL — SIGNIFICANT CHANGE UP (ref 7–23)
CALCIUM SERPL-MCNC: 8.2 MG/DL — LOW (ref 8.5–10.1)
CALCIUM SERPL-MCNC: 8.6 MG/DL — SIGNIFICANT CHANGE UP (ref 8.5–10.1)
CHLORIDE SERPL-SCNC: 103 MMOL/L — SIGNIFICANT CHANGE UP (ref 96–108)
CHLORIDE SERPL-SCNC: 98 MMOL/L — SIGNIFICANT CHANGE UP (ref 96–108)
CO2 SERPL-SCNC: 25 MMOL/L — SIGNIFICANT CHANGE UP (ref 22–31)
CO2 SERPL-SCNC: 27 MMOL/L — SIGNIFICANT CHANGE UP (ref 22–31)
CREAT ?TM UR-MCNC: 112 MG/DL — SIGNIFICANT CHANGE UP
CREAT SERPL-MCNC: 0.72 MG/DL — SIGNIFICANT CHANGE UP (ref 0.5–1.3)
CREAT SERPL-MCNC: 0.79 MG/DL — SIGNIFICANT CHANGE UP (ref 0.5–1.3)
EGFR: 101 ML/MIN/1.73M2 — SIGNIFICANT CHANGE UP
EGFR: 99 ML/MIN/1.73M2 — SIGNIFICANT CHANGE UP
GLUCOSE BLDC GLUCOMTR-MCNC: 109 MG/DL — HIGH (ref 70–99)
GLUCOSE SERPL-MCNC: 112 MG/DL — HIGH (ref 70–99)
GLUCOSE SERPL-MCNC: 130 MG/DL — HIGH (ref 70–99)
HCT VFR BLD CALC: 26 % — LOW (ref 39–50)
HCT VFR BLD CALC: 27.6 % — LOW (ref 39–50)
HGB BLD-MCNC: 8.9 G/DL — LOW (ref 13–17)
HGB BLD-MCNC: 9.4 G/DL — LOW (ref 13–17)
INR BLD: 1.35 RATIO — HIGH (ref 0.85–1.16)
MCHC RBC-ENTMCNC: 24.9 PG — LOW (ref 27–34)
MCHC RBC-ENTMCNC: 25 PG — LOW (ref 27–34)
MCHC RBC-ENTMCNC: 34.1 G/DL — SIGNIFICANT CHANGE UP (ref 32–36)
MCHC RBC-ENTMCNC: 34.2 G/DL — SIGNIFICANT CHANGE UP (ref 32–36)
MCV RBC AUTO: 73 FL — LOW (ref 80–100)
MCV RBC AUTO: 73 FL — LOW (ref 80–100)
NRBC # BLD: 0 /100 WBCS — SIGNIFICANT CHANGE UP (ref 0–0)
NRBC # BLD: 0 /100 WBCS — SIGNIFICANT CHANGE UP (ref 0–0)
OSMOLALITY SERPL: 271 MOSMOL/KG — LOW (ref 280–301)
OSMOLALITY UR: 364 MOSM/KG — SIGNIFICANT CHANGE UP (ref 50–1200)
PLATELET # BLD AUTO: 196 K/UL — SIGNIFICANT CHANGE UP (ref 150–400)
PLATELET # BLD AUTO: 219 K/UL — SIGNIFICANT CHANGE UP (ref 150–400)
POTASSIUM SERPL-MCNC: 3.7 MMOL/L — SIGNIFICANT CHANGE UP (ref 3.5–5.3)
POTASSIUM SERPL-MCNC: 3.8 MMOL/L — SIGNIFICANT CHANGE UP (ref 3.5–5.3)
POTASSIUM SERPL-SCNC: 3.7 MMOL/L — SIGNIFICANT CHANGE UP (ref 3.5–5.3)
POTASSIUM SERPL-SCNC: 3.8 MMOL/L — SIGNIFICANT CHANGE UP (ref 3.5–5.3)
PROTHROM AB SERPL-ACNC: 15.2 SEC — HIGH (ref 9.9–13.4)
RBC # BLD: 3.56 M/UL — LOW (ref 4.2–5.8)
RBC # BLD: 3.78 M/UL — LOW (ref 4.2–5.8)
RBC # FLD: 14.1 % — SIGNIFICANT CHANGE UP (ref 10.3–14.5)
RBC # FLD: 14.2 % — SIGNIFICANT CHANGE UP (ref 10.3–14.5)
SODIUM SERPL-SCNC: 131 MMOL/L — LOW (ref 135–145)
SODIUM SERPL-SCNC: 133 MMOL/L — LOW (ref 135–145)
SODIUM UR-SCNC: 25 MMOL/L — SIGNIFICANT CHANGE UP
WBC # BLD: 10.28 K/UL — SIGNIFICANT CHANGE UP (ref 3.8–10.5)
WBC # BLD: 9.62 K/UL — SIGNIFICANT CHANGE UP (ref 3.8–10.5)
WBC # FLD AUTO: 10.28 K/UL — SIGNIFICANT CHANGE UP (ref 3.8–10.5)
WBC # FLD AUTO: 9.62 K/UL — SIGNIFICANT CHANGE UP (ref 3.8–10.5)

## 2024-10-19 DEVICE — PIN ORTHO PRECISION TAPER 3.9X450MM: Type: IMPLANTABLE DEVICE | Site: RIGHT | Status: FUNCTIONAL

## 2024-10-19 DEVICE — K-WIRE STRYKER 3.2MM X 450MM: Type: IMPLANTABLE DEVICE | Site: RIGHT | Status: FUNCTIONAL

## 2024-10-19 DEVICE — NAIL INTRAMED TROCHANTER 125 DEG 10X170MM: Type: IMPLANTABLE DEVICE | Site: RIGHT | Status: FUNCTIONAL

## 2024-10-19 DEVICE — SCREW LAG GAMMA 10.5X95MM STRL: Type: IMPLANTABLE DEVICE | Site: RIGHT | Status: FUNCTIONAL

## 2024-10-19 DEVICE — SCREW LOKG 5X40MM: Type: IMPLANTABLE DEVICE | Site: RIGHT | Status: FUNCTIONAL

## 2024-10-19 RX ORDER — FENTANYL CITRAT/DEXTROSE 5%/PF 1250MCG/50
50 PATIENT CONTROLLED ANALGESIA SYRINGE INTRAVENOUS
Refills: 0 | Status: DISCONTINUED | OUTPATIENT
Start: 2024-10-19 | End: 2024-10-19

## 2024-10-19 RX ORDER — GLYCERIN/PROPYLENE GLYCOL 0.6 %-0.6%
1 DROPPERETTE, SINGLE-USE DROP DISPENSER OPHTHALMIC (EYE) THREE TIMES A DAY
Refills: 0 | Status: DISCONTINUED | OUTPATIENT
Start: 2024-10-19 | End: 2024-11-04

## 2024-10-19 RX ORDER — FOLIC ACID 1 MG/1
1 TABLET ORAL DAILY
Refills: 0 | Status: DISCONTINUED | OUTPATIENT
Start: 2024-10-19 | End: 2024-11-04

## 2024-10-19 RX ORDER — HYDRALAZINE HYDROCHLORIDE 50 MG/1
100 TABLET, FILM COATED ORAL THREE TIMES A DAY
Refills: 0 | Status: DISCONTINUED | OUTPATIENT
Start: 2024-10-19 | End: 2024-11-04

## 2024-10-19 RX ORDER — LABETALOL HCL 200 MG
300 TABLET ORAL
Refills: 0 | Status: DISCONTINUED | OUTPATIENT
Start: 2024-10-19 | End: 2024-11-04

## 2024-10-19 RX ORDER — SENNA 187 MG
2 TABLET ORAL AT BEDTIME
Refills: 0 | Status: DISCONTINUED | OUTPATIENT
Start: 2024-10-19 | End: 2024-11-04

## 2024-10-19 RX ORDER — TRAMADOL HYDROCHLORIDE 50 MG/1
50 TABLET, COATED ORAL EVERY 6 HOURS
Refills: 0 | Status: DISCONTINUED | OUTPATIENT
Start: 2024-10-19 | End: 2024-10-19

## 2024-10-19 RX ORDER — ASPIRIN/MAG CARB/ALUMINUM AMIN 325 MG
81 TABLET ORAL DAILY
Refills: 0 | Status: DISCONTINUED | OUTPATIENT
Start: 2024-10-20 | End: 2024-11-04

## 2024-10-19 RX ORDER — DIVALPROEX SODIUM 250 MG/1
1000 TABLET, FILM COATED, EXTENDED RELEASE ORAL DAILY
Refills: 0 | Status: DISCONTINUED | OUTPATIENT
Start: 2024-10-19 | End: 2024-11-04

## 2024-10-19 RX ORDER — POLYETHYLENE GLYCOL 3350 17 G/17G
17 POWDER, FOR SOLUTION ORAL DAILY
Refills: 0 | Status: DISCONTINUED | OUTPATIENT
Start: 2024-10-19 | End: 2024-11-04

## 2024-10-19 RX ORDER — ACETAMINOPHEN 500 MG
975 TABLET ORAL EVERY 8 HOURS
Refills: 0 | Status: DISCONTINUED | OUTPATIENT
Start: 2024-10-19 | End: 2024-11-04

## 2024-10-19 RX ORDER — LATANOPROST 0.005 %
1 DROPS OPHTHALMIC (EYE) AT BEDTIME
Refills: 0 | Status: DISCONTINUED | OUTPATIENT
Start: 2024-10-19 | End: 2024-11-04

## 2024-10-19 RX ORDER — MELATONIN 5 MG
3 TABLET ORAL AT BEDTIME
Refills: 0 | Status: DISCONTINUED | OUTPATIENT
Start: 2024-10-19 | End: 2024-11-04

## 2024-10-19 RX ORDER — FINASTERIDE 5 MG/1
5 TABLET, FILM COATED ORAL DAILY
Refills: 0 | Status: DISCONTINUED | OUTPATIENT
Start: 2024-10-19 | End: 2024-11-04

## 2024-10-19 RX ORDER — BENZTROPINE MESYLATE 0.5 MG
2 TABLET ORAL
Refills: 0 | Status: DISCONTINUED | OUTPATIENT
Start: 2024-10-19 | End: 2024-11-04

## 2024-10-19 RX ORDER — OXYCODONE HYDROCHLORIDE 30 MG/1
5 TABLET ORAL EVERY 4 HOURS
Refills: 0 | Status: DISCONTINUED | OUTPATIENT
Start: 2024-10-19 | End: 2024-10-19

## 2024-10-19 RX ORDER — CEFAZOLIN SODIUM 1 G
2000 VIAL (EA) INJECTION EVERY 8 HOURS
Refills: 0 | Status: COMPLETED | OUTPATIENT
Start: 2024-10-19 | End: 2024-10-20

## 2024-10-19 RX ORDER — NIFEDIPINE 90 MG
60 TABLET, EXTENDED RELEASE 24 HR ORAL DAILY
Refills: 0 | Status: DISCONTINUED | OUTPATIENT
Start: 2024-10-19 | End: 2024-11-04

## 2024-10-19 RX ORDER — SODIUM CHLORIDE 9 MG/ML
1000 INJECTION, SOLUTION INTRAMUSCULAR; INTRAVENOUS; SUBCUTANEOUS
Refills: 0 | Status: DISCONTINUED | OUTPATIENT
Start: 2024-10-19 | End: 2024-10-20

## 2024-10-19 RX ORDER — TAMSULOSIN HCL 0.4 MG
0.4 CAPSULE ORAL AT BEDTIME
Refills: 0 | Status: DISCONTINUED | OUTPATIENT
Start: 2024-10-19 | End: 2024-11-04

## 2024-10-19 RX ORDER — ONDANSETRON HYDROCHLORIDE 2 MG/ML
4 INJECTION, SOLUTION INTRAMUSCULAR; INTRAVENOUS ONCE
Refills: 0 | Status: DISCONTINUED | OUTPATIENT
Start: 2024-10-19 | End: 2024-10-19

## 2024-10-19 RX ORDER — OXYCODONE HYDROCHLORIDE 30 MG/1
10 TABLET ORAL EVERY 4 HOURS
Refills: 0 | Status: DISCONTINUED | OUTPATIENT
Start: 2024-10-19 | End: 2024-10-19

## 2024-10-19 RX ORDER — FERROUS SULFATE 325(65) MG
325 TABLET ORAL DAILY
Refills: 0 | Status: DISCONTINUED | OUTPATIENT
Start: 2024-10-19 | End: 2024-11-04

## 2024-10-19 RX ORDER — LISINOPRIL 40 MG
20 TABLET ORAL
Refills: 0 | Status: DISCONTINUED | OUTPATIENT
Start: 2024-10-19 | End: 2024-11-04

## 2024-10-19 RX ORDER — HEPARIN SODIUM 10000 [USP'U]/ML
5000 INJECTION INTRAVENOUS; SUBCUTANEOUS EVERY 8 HOURS
Refills: 0 | Status: DISCONTINUED | OUTPATIENT
Start: 2024-10-20 | End: 2024-10-20

## 2024-10-19 RX ORDER — QUETIAPINE FUMARATE 200 MG/1
200 TABLET ORAL
Refills: 0 | Status: DISCONTINUED | OUTPATIENT
Start: 2024-10-19 | End: 2024-11-04

## 2024-10-19 RX ADMIN — Medication 325 MILLIGRAM(S): at 15:32

## 2024-10-19 RX ADMIN — Medication 60 MILLIGRAM(S): at 17:10

## 2024-10-19 RX ADMIN — FINASTERIDE 5 MILLIGRAM(S): 5 TABLET, FILM COATED ORAL at 15:32

## 2024-10-19 RX ADMIN — Medication 300 MILLIGRAM(S): at 05:25

## 2024-10-19 RX ADMIN — Medication 975 MILLIGRAM(S): at 22:00

## 2024-10-19 RX ADMIN — Medication 2 TABLET(S): at 21:09

## 2024-10-19 RX ADMIN — Medication 2 MILLIGRAM(S): at 05:26

## 2024-10-19 RX ADMIN — Medication 975 MILLIGRAM(S): at 15:33

## 2024-10-19 RX ADMIN — Medication 100 MILLIGRAM(S): at 19:19

## 2024-10-19 RX ADMIN — Medication 975 MILLIGRAM(S): at 05:26

## 2024-10-19 RX ADMIN — Medication 1 DROP(S): at 05:26

## 2024-10-19 RX ADMIN — Medication 300 MILLIGRAM(S): at 17:11

## 2024-10-19 RX ADMIN — Medication 0.4 MILLIGRAM(S): at 21:10

## 2024-10-19 RX ADMIN — Medication 10 MILLIGRAM(S): at 21:10

## 2024-10-19 RX ADMIN — HYDRALAZINE HYDROCHLORIDE 100 MILLIGRAM(S): 50 TABLET, FILM COATED ORAL at 15:00

## 2024-10-19 RX ADMIN — Medication 975 MILLIGRAM(S): at 06:20

## 2024-10-19 RX ADMIN — SODIUM CHLORIDE 125 MILLILITER(S): 9 INJECTION, SOLUTION INTRAMUSCULAR; INTRAVENOUS; SUBCUTANEOUS at 20:38

## 2024-10-19 RX ADMIN — Medication 1 DROP(S): at 21:10

## 2024-10-19 RX ADMIN — Medication 2 MILLIGRAM(S): at 19:16

## 2024-10-19 RX ADMIN — Medication 1 LOZENGE: at 21:15

## 2024-10-19 RX ADMIN — HYDRALAZINE HYDROCHLORIDE 100 MILLIGRAM(S): 50 TABLET, FILM COATED ORAL at 05:25

## 2024-10-19 RX ADMIN — DIVALPROEX SODIUM 1000 MILLIGRAM(S): 250 TABLET, FILM COATED, EXTENDED RELEASE ORAL at 15:32

## 2024-10-19 RX ADMIN — Medication 975 MILLIGRAM(S): at 21:09

## 2024-10-19 RX ADMIN — CHLORHEXIDINE GLUCONATE 1 APPLICATION(S): 40 SOLUTION TOPICAL at 05:28

## 2024-10-19 RX ADMIN — Medication 1 DROP(S): at 15:33

## 2024-10-19 RX ADMIN — SODIUM CHLORIDE 125 MILLILITER(S): 9 INJECTION, SOLUTION INTRAMUSCULAR; INTRAVENOUS; SUBCUTANEOUS at 01:56

## 2024-10-19 RX ADMIN — Medication 3 MILLIGRAM(S): at 21:10

## 2024-10-19 RX ADMIN — QUETIAPINE FUMARATE 200 MILLIGRAM(S): 200 TABLET ORAL at 19:17

## 2024-10-19 RX ADMIN — Medication 60 MILLIGRAM(S): at 05:26

## 2024-10-19 RX ADMIN — QUETIAPINE FUMARATE 200 MILLIGRAM(S): 200 TABLET ORAL at 05:26

## 2024-10-19 RX ADMIN — Medication 20 MILLIGRAM(S): at 05:26

## 2024-10-19 RX ADMIN — HYDRALAZINE HYDROCHLORIDE 100 MILLIGRAM(S): 50 TABLET, FILM COATED ORAL at 21:09

## 2024-10-19 RX ADMIN — Medication 20 MILLIGRAM(S): at 19:17

## 2024-10-19 NOTE — DISCHARGE NOTE PROVIDER - NSDCMRMEDTOKEN_GEN_ALL_CORE_FT
aspirin 81 mg oral capsule: orally once a day    restart in 24 hrs   Cogentin 2 mg oral tablet: 1 tab(s) orally 2 times a day  Colace 100 mg oral capsule: orally once a day  Combigan: 1  to each affected eye every 12 hours  Depakote  mg oral tablet, extended release: 2 tab(s) orally once a day  DermaPhor topical ointment: Apply topically to affected area 2 times a day  ferrous sulfate 325 mg (65 mg elemental iron) oral tablet: 1 tab(s) orally 3 times a day  finasteride 1 mg oral tablet: 1 tab(s) orally once a day  folic acid 1 mg oral tablet: 1 tab(s) orally once a day  Haldol 5 mg/mL injectable solution: 0.8  injectable every 3 weeks  hydrALAZINE 100 mg oral tablet: 1 tab(s) orally 3 times a day  labetalol 300 mg oral tablet: 1 tab(s) orally 2 times a day  latanoprost 0.005% ophthalmic solution: 1 drop(s) to each affected eye once a day (in the evening)  lisinopril 20 mg oral tablet: 1 tab(s) orally once a day  magnesium oxide 400 mg oral tablet: 1 tab(s) orally once a day  MiraLax oral powder for reconstitution: orally once a day  NIFEdipine 60 mg oral tablet, extended release: 1 tab(s) orally once a day  Refresh ophthalmic solution: to each affected eye 3 times a day  SEROquel 200 mg oral tablet: 1 tab(s) orally 2 times a day  simvastatin 20 mg oral tablet: 1 tab(s) orally once a day (at bedtime)  tamsulosin 0.4 mg oral capsule: orally once a day (at bedtime)   acetaminophen 325 mg oral tablet: 3 tab(s) orally every 8 hours  aspirin 81 mg oral capsule: orally once a day    restart in 24 hrs   Cogentin 2 mg oral tablet: 1 tab(s) orally 2 times a day  Colace 100 mg oral capsule: orally once a day  Combigan: 1  to each affected eye every 12 hours  Depakote  mg oral tablet, extended release: 2 tab(s) orally once a day  DermaPhor topical ointment: Apply topically to affected area 2 times a day  enoxaparin 40 mg/0.4 mL injectable solution: 40 milligram(s) injectable once a day Take for 30 days after surgery.  ferrous sulfate 325 mg (65 mg elemental iron) oral tablet: 1 tab(s) orally 3 times a day  finasteride 1 mg oral tablet: 1 tab(s) orally once a day  folic acid 1 mg oral tablet: 1 tab(s) orally once a day  Haldol 5 mg/mL injectable solution: 0.8  injectable every 3 weeks  hydrALAZINE 100 mg oral tablet: 1 tab(s) orally 3 times a day  labetalol 300 mg oral tablet: 1 tab(s) orally 2 times a day  latanoprost 0.005% ophthalmic solution: 1 drop(s) to each affected eye once a day (in the evening)  lisinopril 20 mg oral tablet: 1 tab(s) orally once a day  magnesium oxide 400 mg oral tablet: 1 tab(s) orally once a day  MiraLax oral powder for reconstitution: orally once a day  NIFEdipine 60 mg oral tablet, extended release: 1 tab(s) orally once a day  oxyCODONE 10 mg oral tablet: 1 tab(s) orally every 4 hours As needed Severe Pain (7 - 10)  oxyCODONE 5 mg oral tablet: 1 tab(s) orally every 4 hours As needed Moderate Pain (4 - 6)  Refresh ophthalmic solution: to each affected eye 3 times a day  SEROquel 200 mg oral tablet: 1 tab(s) orally 2 times a day  simvastatin 20 mg oral tablet: 1 tab(s) orally once a day (at bedtime)  tamsulosin 0.4 mg oral capsule: orally once a day (at bedtime)  traMADol 50 mg oral tablet: 1 tab(s) orally every 6 hours As needed Mild Pain (1 - 3)   aspirin 81 mg oral capsule: orally once a day    restart in 24 hrs   Cogentin 2 mg oral tablet: 1 tab(s) orally 2 times a day  Colace 100 mg oral capsule: orally once a day  Combigan: 1  to each affected eye every 12 hours  Depakote  mg oral tablet, extended release: 2 tab(s) orally once a day  DermaPhor topical ointment: Apply topically to affected area 2 times a day  ferrous sulfate 325 mg (65 mg elemental iron) oral tablet: 1 tab(s) orally 3 times a day  finasteride 1 mg oral tablet: 1 tab(s) orally once a day  folic acid 1 mg oral tablet: 1 tab(s) orally once a day  Haldol 5 mg/mL injectable solution: 0.8  injectable every 3 weeks  hydrALAZINE 100 mg oral tablet: 1 tab(s) orally 3 times a day  labetalol 300 mg oral tablet: 1 tab(s) orally 2 times a day  latanoprost 0.005% ophthalmic solution: 1 drop(s) to each affected eye once a day (in the evening)  lisinopril 20 mg oral tablet: 1 tab(s) orally once a day  magnesium oxide 400 mg oral tablet: 1 tab(s) orally once a day  MiraLax oral powder for reconstitution: orally once a day  NIFEdipine 60 mg oral tablet, extended release: 1 tab(s) orally once a day  oxyCODONE 10 mg oral tablet: 1 tab(s) orally every 4 hours As needed Severe Pain (7 - 10)  oxyCODONE 5 mg oral tablet: 1 tab(s) orally every 4 hours As needed Moderate Pain (4 - 6)  Refresh ophthalmic solution: to each affected eye 3 times a day  SEROquel 200 mg oral tablet: 1 tab(s) orally 2 times a day  simvastatin 20 mg oral tablet: 1 tab(s) orally once a day (at bedtime)  tamsulosin 0.4 mg oral capsule: orally once a day (at bedtime)  traMADol 50 mg oral tablet: 1 tab(s) orally every 6 hours As needed Mild Pain (1 - 3)   aspirin 81 mg oral capsule: orally once a day    restart in 24 hrs   Cogentin 2 mg oral tablet: 1 tab(s) orally 2 times a day  Colace 100 mg oral capsule: orally once a day  Combigan: 1  to each affected eye every 12 hours  Depakote  mg oral tablet, extended release: 2 tab(s) orally once a day  DermaPhor topical ointment: Apply topically to affected area 2 times a day  ferrous sulfate 325 mg (65 mg elemental iron) oral tablet: 1 tab(s) orally 3 times a day  finasteride 1 mg oral tablet: 1 tab(s) orally once a day  folic acid 1 mg oral tablet: 1 tab(s) orally once a day  Haldol 5 mg/mL injectable solution: 0.8  injectable every 3 weeks  hydrALAZINE 100 mg oral tablet: 1 tab(s) orally 3 times a day  labetalol 300 mg oral tablet: 1 tab(s) orally 2 times a day  latanoprost 0.005% ophthalmic solution: 1 drop(s) to each affected eye once a day (in the evening)  lisinopril 20 mg oral tablet: 1 tab(s) orally once a day  magnesium oxide 400 mg oral tablet: 1 tab(s) orally once a day  MiraLax oral powder for reconstitution: orally once a day  NIFEdipine 60 mg oral tablet, extended release: 1 tab(s) orally once a day  Refresh ophthalmic solution: to each affected eye 3 times a day  SEROquel 200 mg oral tablet: 1 tab(s) orally 2 times a day  simvastatin 20 mg oral tablet: 1 tab(s) orally once a day (at bedtime)  tamsulosin 0.4 mg oral capsule: orally once a day (at bedtime)  traMADol 50 mg oral tablet: 1 tab(s) orally every 6 hours As needed Mild Pain (1 - 3)

## 2024-10-19 NOTE — OCCUPATIONAL THERAPY INITIAL EVALUATION ADULT - GENERAL OBSERVATIONS, REHAB EVAL
Pt was encountered supine in bed; NAD, S/P R hip IMN POD 0, RLE WBAT, R hip dressing clean, dry and intact, alert, verbalized name and , cooperative, followed commands; pt reported to have no pain. PT Cassandra present.

## 2024-10-19 NOTE — BRIEF OPERATIVE NOTE - SECOND ASSIST NAME
Brendan Hansen (Resident) Alert-The patient is alert, awake and responds to voice. The patient is oriented to time, place, and person. The triage nurse is able to obtain subjective information.

## 2024-10-19 NOTE — OCCUPATIONAL THERAPY INITIAL EVALUATION ADULT - TRANSFER TRAINING, PT EVAL
Elida from the [facility] said the death certificate was still denied. They stated you had abbreviations in the 2nd half of the certificate. They cannot accept it. Her number is [id number]. Please advise.   
Patient will be able to perform functional transfers, using least restrictive device, independently within 4 weeks.

## 2024-10-19 NOTE — OCCUPATIONAL THERAPY INITIAL EVALUATION ADULT - ADDITIONAL COMMENTS
Pt lives with in a group home with 3-4 steps to enter with a rail. Once inside, the pt main bedroom and bathroom is on that floor when entering. The pt ambulates with a quad cane all the time.

## 2024-10-19 NOTE — DISCHARGE NOTE PROVIDER - DETAILS OF MALNUTRITION DIAGNOSIS/DIAGNOSES
This patient has been assessed with a concern for Malnutrition and was treated during this hospitalization for the following Nutrition diagnosis/diagnoses:     -  10/21/2024: Moderate protein-calorie malnutrition

## 2024-10-19 NOTE — PHYSICAL THERAPY INITIAL EVALUATION ADULT - ACTIVE RANGE OF MOTION EXAMINATION, REHAB EVAL
R hip/knee diminished due to weakness/bilateral upper extremity Active ROM was WFL (within functional limits)/Left LE Active ROM was WFL (within functional limits)/deficits as listed below

## 2024-10-19 NOTE — OCCUPATIONAL THERAPY INITIAL EVALUATION ADULT - RANGE OF MOTION EXAMINATION, LOWER EXTREMITY
[Time Spent: ___ minutes] : I have spent [unfilled] minutes of time on the encounter. RLE AROM grossly impaired throughout./Left LE Active ROM was WFL (within functional limits)

## 2024-10-19 NOTE — DISCHARGE NOTE PROVIDER - NSDCCPCAREPLAN_GEN_ALL_CORE_FT
PRINCIPAL DISCHARGE DIAGNOSIS  Diagnosis: Intertrochanteric fracture of right hip  Assessment and Plan of Treatment:      PRINCIPAL DISCHARGE DIAGNOSIS  Diagnosis: Intertrochanteric fracture of right hip  Assessment and Plan of Treatment: ambulate as much as you can to prevent blood clots  follow up with orthopedics after discharge

## 2024-10-19 NOTE — DISCHARGE NOTE PROVIDER - HOSPITAL COURSE
The patient is a 64y Male status post R hip intramedullary nail after suffering a Right IT fracture. Patient presented to [facility] after being medically cleared for the surgical procedure. The patient was taken to the operating room on date mentioned above. Prophylactic antibiotics were started before the procedure and continued for 24 hours. There were no complications during the procedure and patient tolerated the procedure well. The patient was transferred to the recovery room in stable condition and subsequently to the surgical floor. The patient was placed on Heparin for anticoagulation while in house. All home medications were continued. The patient received physical therapy daily and daily labs were followed. The dressing was kept clean, dry, intact. The rest of the hospital stay was unremarkable. The patient is a 64y Male status post R hip intramedullary nail after suffering a Right IT fracture. Patient presented to [facility] after being medically cleared for the surgical procedure. The patient was taken to the operating room on date mentioned above. Prophylactic antibiotics were started before the procedure and continued for 24 hours. There were no complications during the procedure and patient tolerated the procedure well. The patient was transferred to the recovery room in stable condition and subsequently to the surgical floor. The patient was placed on Heparin for anticoagulation while in house. All home medications were continued. The patient received physical therapy daily and daily labs were followed. The dressing was kept clean, dry, intact. The rest of the hospital stay was unremarkable.     Discharge PE:  ICU Vital Signs Last 24 Hrs  T(C): 36.6 (21 Oct 2024 11:50), Max: 36.9 (20 Oct 2024 16:10)  T(F): 97.8 (21 Oct 2024 11:50), Max: 98.4 (20 Oct 2024 16:10)  HR: 79 (21 Oct 2024 11:50) (65 - 79)  BP: 161/87 (21 Oct 2024 11:50) (112/69 - 167/97)  BP(mean): --  ABP: --  ABP(mean): --  RR: 18 (21 Oct 2024 11:50) (16 - 19)  SpO2: 99% (21 Oct 2024 11:50) (97% - 100%)    O2 Parameters below as of 21 Oct 2024 11:50  Patient On (Oxygen Delivery Method): room air        Gen: NAD    Right Lower Extremity:  Dressing clean/dry/intact  Soft compartments  Negative calf ttp  +EHL/FHL/TA/GSc  SILT SPN, DPN, Tib, Saph, Frank  DP+ no The patient is a 64y Male status post R hip intramedullary nail after suffering a Right IT fracture. Patient presented to [facility] after being medically cleared for the surgical procedure. The patient was taken to the operating room on date mentioned above. Prophylactic antibiotics were started before the procedure and continued for 24 hours. There were no complications during the procedure and patient tolerated the procedure well. The patient was transferred to the recovery room in stable condition and subsequently to the surgical floor. The patient was placed on Heparin for anticoagulation while in house. All home medications were continued. The patient received physical therapy daily and daily labs were followed. The dressing was kept clean, dry, intact. The rest of the hospital stay was unremarkable.             Gen: NAD    Right Lower Extremity:  Dressing clean/dry/intact  Soft compartments  Negative calf ttp  +EHL/FHL/TA/GSc  SILT SPN, DPN, Tib, Saph, Frank  DP+    64 year old male with PMHx of HTN, HLD, alpha-thalassemia minor, BPH, and bipolar disorder who presented to the ED on 10/18/24 for complaints of fall and admitted for R. hip intertrochanteric fracture.        group home dc planning on monday     R. hip intertrochanteric fracture secondary to mechanical fall   sp R Hip IMN          Moderate hyponatremia, clinically euvolemic  resolved      Chronic medical conditions:  HTN: c/w home meds   HLD: continue simvastatin 20 mg qhs  BPH: r continue tamsulosin 0.4 mg qhs, finasteride 5 mg  Bipolar disorder:  continue valproate  mg 2 tabs qhs, quetiapine 200 mg BID, benztropine 2 mg BID to prevent extrapyramidal symptoms    completed 2weeks of dvt ppx post surgery of lovenox , pt ambulating. will continue on asa    pt seen and examined 45 min spent on dc planning     Lab test review, Radiology Review, Vitals review, Consultant review and discussion, Physical examination, IDR, Assessment and plan; Plan discussion with patient and family

## 2024-10-19 NOTE — DISCHARGE NOTE PROVIDER - CARE PROVIDER_API CALL
Eamon Bentley  Orthopaedic Surgery  [address] [place] [address]  Phone: [phone]  Fax: [phone]  Follow Up Time:

## 2024-10-19 NOTE — OCCUPATIONAL THERAPY INITIAL EVALUATION ADULT - PERSONAL SAFETY AND JUDGMENT, REHAB EVAL
required verbal/physical cues for hand/foot/walker placement and task sequencing./impaired/at risk behaviors demonstrated

## 2024-10-19 NOTE — DISCHARGE NOTE PROVIDER - NSDCFUADDINST_GEN_ALL_CORE_FT
IM Nail DC Instructions:    1. ACTIVITY: Weight bearing as tolerated with assistive devices (i.e. cane/walker).  2. DVT/PE Prophylaxis: Continue anticoagulation medication per AC team. See Med Rec for duration and dose.  3. PHYSICAL THERAPY: You should receive physical therapy daily.  4. FOLLOW UP: Follow up outpatient with your Orthopedic Surgeon, Dr. Bentley in 14 Days. Please call the office for your appointment.   5. STAPLES: Remove by RN POD14   6. BANDAGE: Change bandage on POD7 to dry gauze and tegaderm or paper tape. Can also change PRN if saturated. Do NOT remove on arrival to inspect wound.   IM Nail DC Instructions:    1. ACTIVITY: Weight bearing as tolerated with assistive devices (i.e. cane/walker).  2. DVT/PE Prophylaxis: Continue anticoagulation medication. See Med Rec for duration and dose.  3. PHYSICAL THERAPY: You should receive physical therapy daily.  4. FOLLOW UP: Follow up outpatient with your Orthopedic Surgeon, Dr. Bentley in 14 Days. Please call the office for your appointment.   5. STAPLES: Remove by RN on postop day 14.  6. BANDAGE: Change bandage on postop day 7 to dry gauze and tegaderm or paper tape. Can also change PRN if saturated. Do NOT remove on arrival to inspect wound.   IM Nail DC Instructions:    1. ACTIVITY: Weight bearing as tolerated with assistive devices (i.e. cane/walker).  2. DVT/PE Prophylaxis: Continue anticoagulation medication.   3. PHYSICAL THERAPY: You should receive physical therapy daily.  4. FOLLOW UP: Follow up outpatient with your Orthopedic Surgeon, Dr. Bentley in 14 Days. Please call the office for your appointment.   5. STAPLES: Remove by RN on postop day 14.  6. BANDAGE: Change bandage on postop day 7 to dry gauze and tegaderm or paper tape. Can also change PRN if saturated. Do NOT remove on arrival to inspect wound.

## 2024-10-19 NOTE — PRE-OP CHECKLIST - SELECT TESTS ORDERED
BMP/CBC/PT/PTT/INR/Type and Screen Render Post-Care Instructions In Note?: no Consent: Verbal consent obtained. Informed of possible scar, blister and reoccurance. ICG given by patient and parent. \\Colby. Medical Necessity Information: It is in your best interest to select a reason for this procedure from the list below. All of these items fulfill various CMS LCD requirements except the new and changing color options. Detail Level: Simple

## 2024-10-19 NOTE — OCCUPATIONAL THERAPY INITIAL EVALUATION ADULT - PERTINENT HX OF CURRENT PROBLEM, REHAB EVAL
As per H&P; Patient is a 64yMale ambulator with cane who presents to [facility] ED w/ a c/o of R hip pain. Pt states he slipped on water and fell on the way to the bathroom. Denies HT/LOC. States inability to walk immediately following the injury. Denies any numbness or tingling. Denies having any other pain elsewhere. Denies orthopedic history. No other orthopedic concerns at this time.

## 2024-10-20 LAB
ANION GAP SERPL CALC-SCNC: 7 MMOL/L — SIGNIFICANT CHANGE UP (ref 5–17)
BUN SERPL-MCNC: 10 MG/DL — SIGNIFICANT CHANGE UP (ref 7–23)
CALCIUM SERPL-MCNC: 8.4 MG/DL — LOW (ref 8.5–10.1)
CHLORIDE SERPL-SCNC: 98 MMOL/L — SIGNIFICANT CHANGE UP (ref 96–108)
CO2 SERPL-SCNC: 25 MMOL/L — SIGNIFICANT CHANGE UP (ref 22–31)
CREAT SERPL-MCNC: 0.79 MG/DL — SIGNIFICANT CHANGE UP (ref 0.5–1.3)
EGFR: 99 ML/MIN/1.73M2 — SIGNIFICANT CHANGE UP
GLUCOSE SERPL-MCNC: 113 MG/DL — HIGH (ref 70–99)
HCT VFR BLD CALC: 25.1 % — LOW (ref 39–50)
HGB BLD-MCNC: 8.6 G/DL — LOW (ref 13–17)
MCHC RBC-ENTMCNC: 25.2 PG — LOW (ref 27–34)
MCHC RBC-ENTMCNC: 34.3 G/DL — SIGNIFICANT CHANGE UP (ref 32–36)
MCV RBC AUTO: 73.6 FL — LOW (ref 80–100)
NRBC # BLD: 0 /100 WBCS — SIGNIFICANT CHANGE UP (ref 0–0)
PLATELET # BLD AUTO: 202 K/UL — SIGNIFICANT CHANGE UP (ref 150–400)
POTASSIUM SERPL-MCNC: 3.6 MMOL/L — SIGNIFICANT CHANGE UP (ref 3.5–5.3)
POTASSIUM SERPL-SCNC: 3.6 MMOL/L — SIGNIFICANT CHANGE UP (ref 3.5–5.3)
RBC # BLD: 3.41 M/UL — LOW (ref 4.2–5.8)
RBC # FLD: 14.2 % — SIGNIFICANT CHANGE UP (ref 10.3–14.5)
SODIUM SERPL-SCNC: 130 MMOL/L — LOW (ref 135–145)
WBC # BLD: 12.09 K/UL — HIGH (ref 3.8–10.5)
WBC # FLD AUTO: 12.09 K/UL — HIGH (ref 3.8–10.5)

## 2024-10-20 RX ORDER — OXYCODONE HYDROCHLORIDE 30 MG/1
1 TABLET ORAL
Qty: 0 | Refills: 0 | DISCHARGE
Start: 2024-10-20

## 2024-10-20 RX ORDER — TRAMADOL HYDROCHLORIDE 50 MG/1
1 TABLET, COATED ORAL
Qty: 0 | Refills: 0 | DISCHARGE
Start: 2024-10-20

## 2024-10-20 RX ORDER — ENOXAPARIN SODIUM 40MG/0.4ML
40 SYRINGE (ML) SUBCUTANEOUS
Qty: 0 | Refills: 0 | DISCHARGE
Start: 2024-10-20

## 2024-10-20 RX ORDER — SODIUM CHLORIDE 9 MG/ML
1000 INJECTION, SOLUTION INTRAMUSCULAR; INTRAVENOUS; SUBCUTANEOUS
Refills: 0 | Status: DISCONTINUED | OUTPATIENT
Start: 2024-10-20 | End: 2024-10-26

## 2024-10-20 RX ORDER — ENOXAPARIN SODIUM 40MG/0.4ML
40 SYRINGE (ML) SUBCUTANEOUS EVERY 24 HOURS
Refills: 0 | Status: DISCONTINUED | OUTPATIENT
Start: 2024-10-20 | End: 2024-11-04

## 2024-10-20 RX ORDER — ENOXAPARIN SODIUM 40MG/0.4ML
40 SYRINGE (ML) SUBCUTANEOUS EVERY 24 HOURS
Refills: 0 | Status: DISCONTINUED | OUTPATIENT
Start: 2024-10-20 | End: 2024-10-20

## 2024-10-20 RX ORDER — ACETAMINOPHEN 500 MG
3 TABLET ORAL
Qty: 0 | Refills: 0 | DISCHARGE
Start: 2024-10-20

## 2024-10-20 RX ORDER — CEFAZOLIN SODIUM 1 G
2000 VIAL (EA) INJECTION ONCE
Refills: 0 | Status: COMPLETED | OUTPATIENT
Start: 2024-10-20 | End: 2024-10-20

## 2024-10-20 RX ADMIN — QUETIAPINE FUMARATE 200 MILLIGRAM(S): 200 TABLET ORAL at 18:19

## 2024-10-20 RX ADMIN — DIVALPROEX SODIUM 1000 MILLIGRAM(S): 250 TABLET, FILM COATED, EXTENDED RELEASE ORAL at 13:18

## 2024-10-20 RX ADMIN — FOLIC ACID 1 MILLIGRAM(S): 1 TABLET ORAL at 13:18

## 2024-10-20 RX ADMIN — QUETIAPINE FUMARATE 200 MILLIGRAM(S): 200 TABLET ORAL at 05:28

## 2024-10-20 RX ADMIN — Medication 1 LOZENGE: at 05:29

## 2024-10-20 RX ADMIN — Medication 2 MILLIGRAM(S): at 05:29

## 2024-10-20 RX ADMIN — FINASTERIDE 5 MILLIGRAM(S): 5 TABLET, FILM COATED ORAL at 13:19

## 2024-10-20 RX ADMIN — Medication 0.4 MILLIGRAM(S): at 21:35

## 2024-10-20 RX ADMIN — Medication 975 MILLIGRAM(S): at 21:34

## 2024-10-20 RX ADMIN — POLYETHYLENE GLYCOL 3350 17 GRAM(S): 17 POWDER, FOR SOLUTION ORAL at 13:24

## 2024-10-20 RX ADMIN — Medication 20 MILLIGRAM(S): at 18:23

## 2024-10-20 RX ADMIN — Medication 100 MILLIGRAM(S): at 04:44

## 2024-10-20 RX ADMIN — HEPARIN SODIUM 5000 UNIT(S): 10000 INJECTION INTRAVENOUS; SUBCUTANEOUS at 05:28

## 2024-10-20 RX ADMIN — Medication 1 DROP(S): at 21:34

## 2024-10-20 RX ADMIN — SODIUM CHLORIDE 125 MILLILITER(S): 9 INJECTION, SOLUTION INTRAMUSCULAR; INTRAVENOUS; SUBCUTANEOUS at 05:30

## 2024-10-20 RX ADMIN — Medication 2 TABLET(S): at 21:34

## 2024-10-20 RX ADMIN — Medication 975 MILLIGRAM(S): at 06:18

## 2024-10-20 RX ADMIN — Medication 10 MILLIGRAM(S): at 21:35

## 2024-10-20 RX ADMIN — HYDRALAZINE HYDROCHLORIDE 100 MILLIGRAM(S): 50 TABLET, FILM COATED ORAL at 15:34

## 2024-10-20 RX ADMIN — Medication 20 MILLIGRAM(S): at 05:28

## 2024-10-20 RX ADMIN — Medication 300 MILLIGRAM(S): at 18:54

## 2024-10-20 RX ADMIN — Medication 60 MILLIGRAM(S): at 05:29

## 2024-10-20 RX ADMIN — Medication 2 MILLIGRAM(S): at 18:18

## 2024-10-20 RX ADMIN — Medication 975 MILLIGRAM(S): at 05:28

## 2024-10-20 RX ADMIN — Medication 325 MILLIGRAM(S): at 13:19

## 2024-10-20 RX ADMIN — Medication 975 MILLIGRAM(S): at 22:30

## 2024-10-20 RX ADMIN — Medication 3 MILLIGRAM(S): at 21:34

## 2024-10-20 RX ADMIN — Medication 1 LOZENGE: at 21:34

## 2024-10-20 RX ADMIN — HYDRALAZINE HYDROCHLORIDE 100 MILLIGRAM(S): 50 TABLET, FILM COATED ORAL at 21:34

## 2024-10-20 RX ADMIN — Medication 300 MILLIGRAM(S): at 05:28

## 2024-10-20 RX ADMIN — Medication 40 MILLIGRAM(S): at 18:23

## 2024-10-20 RX ADMIN — Medication 81 MILLIGRAM(S): at 13:20

## 2024-10-20 RX ADMIN — Medication 1 DROP(S): at 05:29

## 2024-10-20 RX ADMIN — HYDRALAZINE HYDROCHLORIDE 100 MILLIGRAM(S): 50 TABLET, FILM COATED ORAL at 05:29

## 2024-10-21 LAB
ANION GAP SERPL CALC-SCNC: 5 MMOL/L — SIGNIFICANT CHANGE UP (ref 5–17)
BUN SERPL-MCNC: 8 MG/DL — SIGNIFICANT CHANGE UP (ref 7–23)
CALCIUM SERPL-MCNC: 8.3 MG/DL — LOW (ref 8.5–10.1)
CHLORIDE SERPL-SCNC: 102 MMOL/L — SIGNIFICANT CHANGE UP (ref 96–108)
CO2 SERPL-SCNC: 26 MMOL/L — SIGNIFICANT CHANGE UP (ref 22–31)
CREAT SERPL-MCNC: 0.54 MG/DL — SIGNIFICANT CHANGE UP (ref 0.5–1.3)
EGFR: 111 ML/MIN/1.73M2 — SIGNIFICANT CHANGE UP
GLUCOSE SERPL-MCNC: 107 MG/DL — HIGH (ref 70–99)
HCT VFR BLD CALC: 28.1 % — LOW (ref 39–50)
HGB BLD-MCNC: 9.7 G/DL — LOW (ref 13–17)
MCHC RBC-ENTMCNC: 25.4 PG — LOW (ref 27–34)
MCHC RBC-ENTMCNC: 34.5 G/DL — SIGNIFICANT CHANGE UP (ref 32–36)
MCV RBC AUTO: 73.6 FL — LOW (ref 80–100)
NRBC # BLD: 0 /100 WBCS — SIGNIFICANT CHANGE UP (ref 0–0)
PLATELET # BLD AUTO: 194 K/UL — SIGNIFICANT CHANGE UP (ref 150–400)
POTASSIUM SERPL-MCNC: 3.3 MMOL/L — LOW (ref 3.5–5.3)
POTASSIUM SERPL-SCNC: 3.3 MMOL/L — LOW (ref 3.5–5.3)
RBC # BLD: 3.82 M/UL — LOW (ref 4.2–5.8)
RBC # FLD: 14 % — SIGNIFICANT CHANGE UP (ref 10.3–14.5)
SODIUM SERPL-SCNC: 133 MMOL/L — LOW (ref 135–145)
WBC # BLD: 11.64 K/UL — HIGH (ref 3.8–10.5)
WBC # FLD AUTO: 11.64 K/UL — HIGH (ref 3.8–10.5)

## 2024-10-21 RX ORDER — POTASSIUM CHLORIDE 10 MEQ
20 TABLET, EXTENDED RELEASE ORAL ONCE
Refills: 0 | Status: COMPLETED | OUTPATIENT
Start: 2024-10-21 | End: 2024-10-21

## 2024-10-21 RX ADMIN — Medication 975 MILLIGRAM(S): at 23:33

## 2024-10-21 RX ADMIN — Medication 975 MILLIGRAM(S): at 15:17

## 2024-10-21 RX ADMIN — SODIUM CHLORIDE 125 MILLILITER(S): 9 INJECTION, SOLUTION INTRAMUSCULAR; INTRAVENOUS; SUBCUTANEOUS at 18:57

## 2024-10-21 RX ADMIN — Medication 1 DROP(S): at 22:34

## 2024-10-21 RX ADMIN — Medication 40 MILLIGRAM(S): at 18:54

## 2024-10-21 RX ADMIN — Medication 1 LOZENGE: at 22:33

## 2024-10-21 RX ADMIN — Medication 300 MILLIGRAM(S): at 18:56

## 2024-10-21 RX ADMIN — DIVALPROEX SODIUM 1000 MILLIGRAM(S): 250 TABLET, FILM COATED, EXTENDED RELEASE ORAL at 13:43

## 2024-10-21 RX ADMIN — Medication 1 LOZENGE: at 05:41

## 2024-10-21 RX ADMIN — Medication 10 MILLIGRAM(S): at 22:33

## 2024-10-21 RX ADMIN — FINASTERIDE 5 MILLIGRAM(S): 5 TABLET, FILM COATED ORAL at 13:43

## 2024-10-21 RX ADMIN — QUETIAPINE FUMARATE 200 MILLIGRAM(S): 200 TABLET ORAL at 05:41

## 2024-10-21 RX ADMIN — Medication 975 MILLIGRAM(S): at 16:17

## 2024-10-21 RX ADMIN — HYDRALAZINE HYDROCHLORIDE 100 MILLIGRAM(S): 50 TABLET, FILM COATED ORAL at 22:34

## 2024-10-21 RX ADMIN — Medication 2 MILLIGRAM(S): at 18:56

## 2024-10-21 RX ADMIN — Medication 325 MILLIGRAM(S): at 13:43

## 2024-10-21 RX ADMIN — SODIUM CHLORIDE 125 MILLILITER(S): 9 INJECTION, SOLUTION INTRAMUSCULAR; INTRAVENOUS; SUBCUTANEOUS at 08:44

## 2024-10-21 RX ADMIN — Medication 60 MILLIGRAM(S): at 05:42

## 2024-10-21 RX ADMIN — HYDRALAZINE HYDROCHLORIDE 100 MILLIGRAM(S): 50 TABLET, FILM COATED ORAL at 05:41

## 2024-10-21 RX ADMIN — HYDRALAZINE HYDROCHLORIDE 100 MILLIGRAM(S): 50 TABLET, FILM COATED ORAL at 15:20

## 2024-10-21 RX ADMIN — FOLIC ACID 1 MILLIGRAM(S): 1 TABLET ORAL at 13:43

## 2024-10-21 RX ADMIN — Medication 975 MILLIGRAM(S): at 22:33

## 2024-10-21 RX ADMIN — Medication 0.4 MILLIGRAM(S): at 22:32

## 2024-10-21 RX ADMIN — Medication 81 MILLIGRAM(S): at 13:43

## 2024-10-21 RX ADMIN — Medication 1 DROP(S): at 22:33

## 2024-10-21 RX ADMIN — SODIUM CHLORIDE 125 MILLILITER(S): 9 INJECTION, SOLUTION INTRAMUSCULAR; INTRAVENOUS; SUBCUTANEOUS at 05:42

## 2024-10-21 RX ADMIN — Medication 2 TABLET(S): at 22:32

## 2024-10-21 RX ADMIN — Medication 2 MILLIGRAM(S): at 05:41

## 2024-10-21 RX ADMIN — Medication 300 MILLIGRAM(S): at 05:42

## 2024-10-21 RX ADMIN — Medication 20 MILLIGRAM(S): at 18:56

## 2024-10-21 RX ADMIN — Medication 1 DROP(S): at 05:41

## 2024-10-21 RX ADMIN — Medication 1 DROP(S): at 15:19

## 2024-10-21 RX ADMIN — Medication 20 MILLIGRAM(S): at 06:33

## 2024-10-21 RX ADMIN — Medication 1 LOZENGE: at 15:18

## 2024-10-21 RX ADMIN — Medication 3 MILLIGRAM(S): at 22:33

## 2024-10-21 RX ADMIN — QUETIAPINE FUMARATE 200 MILLIGRAM(S): 200 TABLET ORAL at 18:55

## 2024-10-21 RX ADMIN — Medication 20 MILLIEQUIVALENT(S): at 11:45

## 2024-10-21 NOTE — PROGRESS NOTE ADULT - ATTENDING COMMENTS
For surgical fixation of right hip intertrochanteric fracture today. The patient is aware about all other treatment options, complications, the recovery and the outcome.
Ortho stable. May follow as outpatient after discharge when cleared by PT and medicine.
Ortho stable. May follow as outpatient after discharge.

## 2024-10-21 NOTE — DIETITIAN INITIAL EVALUATION ADULT - PERTINENT MEDS FT
Lovenox, NS @ 125 ml/hr, Lipitor, Cogentin, Depakote ER, Iron Sulfate, Proscar, folic acid, Hydralazine, Normodyne, Zestril, Melatonin, Procardia XL, Oxycodone IR, Miralax, Seroquel, Flomax, Ultram

## 2024-10-21 NOTE — DIETITIAN INITIAL EVALUATION ADULT - ORAL NUTRITION SUPPLEMENTS
Ensure Pudding x 2/day (provides 480 kcal, 24 g protein)  Ensure Plus High Protein x 2/day (provides 700 kcal, 40 g protein)

## 2024-10-21 NOTE — DIETITIAN NUTRITION RISK NOTIFICATION - TREATMENT: THE FOLLOWING DIET HAS BEEN RECOMMENDED
Diet, Easy to Chew:   Low Sodium  Supplement Feeding Modality:  Oral  Ensure Plus High Protein Cans or Servings Per Day:  1       Frequency:  Two Times a day (10-21-24 @ 12:09) [Pending Verification By Attending]  Diet, Regular:   DASH/TLC {Sodium & Cholesterol Restricted} (10-19-24 @ 14:12) [Active]  Diet, Clear Liquid (10-19-24 @ 11:44) [Available for Activation]

## 2024-10-21 NOTE — DIETITIAN INITIAL EVALUATION ADULT - OTHER INFO
Pt resides at group home; [facility] bedside at time of visit.  Aide reports pt eats well; on low Na diet c cut-up foods at home (for ease of eating).  Denies any N/V/C/D.  Per review of medical record pt c wt loss as noted based on ED visit (6/2023).  Pt presented c right hip pain s/p fall at home; now s/p right hip IMN (10/19); d/c plan is for [facility].

## 2024-10-21 NOTE — DIETITIAN INITIAL EVALUATION ADULT - WEIGHT (LBS)
I was notified by speech therapy that the patient began jerking and then became unresponsive for about 30 seconds. Upon assessment the patient was conscious and complained of anxiety. She appeared to be in a heart block and became diaphoretic and hypotensive. Dr Briggs was paged, see orders.    190.4

## 2024-10-21 NOTE — DIETITIAN INITIAL EVALUATION ADULT - NSICDXPASTMEDICALHX_GEN_ALL_CORE_FT
PAST MEDICAL HISTORY:  Autism     Bipolar disorder     BPH (benign prostatic hyperplasia)     Current every day smoker     HLD (hyperlipidemia)     HTN (hypertension)

## 2024-10-21 NOTE — DIETITIAN INITIAL EVALUATION ADULT - PERTINENT LABORATORY DATA
10-21    133[L]  |  102  |  8   ----------------------------<  107[H]  3.3[L]   |  26  |  0.54    Ca    8.3[L]      21 Oct 2024 08:20

## 2024-10-22 LAB
ANION GAP SERPL CALC-SCNC: 7 MMOL/L — SIGNIFICANT CHANGE UP (ref 5–17)
BUN SERPL-MCNC: 5 MG/DL — LOW (ref 7–23)
CALCIUM SERPL-MCNC: 8.5 MG/DL — SIGNIFICANT CHANGE UP (ref 8.5–10.1)
CHLORIDE SERPL-SCNC: 103 MMOL/L — SIGNIFICANT CHANGE UP (ref 96–108)
CO2 SERPL-SCNC: 25 MMOL/L — SIGNIFICANT CHANGE UP (ref 22–31)
CREAT SERPL-MCNC: 0.51 MG/DL — SIGNIFICANT CHANGE UP (ref 0.5–1.3)
EGFR: 113 ML/MIN/1.73M2 — SIGNIFICANT CHANGE UP
GLUCOSE SERPL-MCNC: 111 MG/DL — HIGH (ref 70–99)
HCT VFR BLD CALC: 25.9 % — LOW (ref 39–50)
HGB BLD-MCNC: 9.2 G/DL — LOW (ref 13–17)
MCHC RBC-ENTMCNC: 26.3 PG — LOW (ref 27–34)
MCHC RBC-ENTMCNC: 35.5 G/DL — SIGNIFICANT CHANGE UP (ref 32–36)
MCV RBC AUTO: 74 FL — LOW (ref 80–100)
NRBC # BLD: 0 /100 WBCS — SIGNIFICANT CHANGE UP (ref 0–0)
PLATELET # BLD AUTO: 228 K/UL — SIGNIFICANT CHANGE UP (ref 150–400)
POTASSIUM SERPL-MCNC: 3.2 MMOL/L — LOW (ref 3.5–5.3)
POTASSIUM SERPL-SCNC: 3.2 MMOL/L — LOW (ref 3.5–5.3)
RBC # BLD: 3.5 M/UL — LOW (ref 4.2–5.8)
RBC # FLD: 14.3 % — SIGNIFICANT CHANGE UP (ref 10.3–14.5)
SODIUM SERPL-SCNC: 135 MMOL/L — SIGNIFICANT CHANGE UP (ref 135–145)
WBC # BLD: 9.99 K/UL — SIGNIFICANT CHANGE UP (ref 3.8–10.5)
WBC # FLD AUTO: 9.99 K/UL — SIGNIFICANT CHANGE UP (ref 3.8–10.5)

## 2024-10-22 PROCEDURE — 99232 SBSQ HOSP IP/OBS MODERATE 35: CPT

## 2024-10-22 RX ORDER — POTASSIUM CHLORIDE 10 MEQ
40 TABLET, EXTENDED RELEASE ORAL ONCE
Refills: 0 | Status: COMPLETED | OUTPATIENT
Start: 2024-10-22 | End: 2024-10-22

## 2024-10-22 RX ADMIN — QUETIAPINE FUMARATE 200 MILLIGRAM(S): 200 TABLET ORAL at 06:24

## 2024-10-22 RX ADMIN — Medication 3 MILLIGRAM(S): at 22:26

## 2024-10-22 RX ADMIN — Medication 1 LOZENGE: at 13:27

## 2024-10-22 RX ADMIN — Medication 300 MILLIGRAM(S): at 06:20

## 2024-10-22 RX ADMIN — Medication 40 MILLIGRAM(S): at 18:30

## 2024-10-22 RX ADMIN — Medication 975 MILLIGRAM(S): at 06:21

## 2024-10-22 RX ADMIN — Medication 10 MILLIGRAM(S): at 22:24

## 2024-10-22 RX ADMIN — Medication 60 MILLIGRAM(S): at 06:20

## 2024-10-22 RX ADMIN — Medication 20 MILLIGRAM(S): at 06:21

## 2024-10-22 RX ADMIN — HYDRALAZINE HYDROCHLORIDE 100 MILLIGRAM(S): 50 TABLET, FILM COATED ORAL at 22:24

## 2024-10-22 RX ADMIN — Medication 975 MILLIGRAM(S): at 14:25

## 2024-10-22 RX ADMIN — Medication 1 LOZENGE: at 22:24

## 2024-10-22 RX ADMIN — FOLIC ACID 1 MILLIGRAM(S): 1 TABLET ORAL at 13:27

## 2024-10-22 RX ADMIN — FINASTERIDE 5 MILLIGRAM(S): 5 TABLET, FILM COATED ORAL at 13:27

## 2024-10-22 RX ADMIN — Medication 975 MILLIGRAM(S): at 07:30

## 2024-10-22 RX ADMIN — DIVALPROEX SODIUM 1000 MILLIGRAM(S): 250 TABLET, FILM COATED, EXTENDED RELEASE ORAL at 13:30

## 2024-10-22 RX ADMIN — Medication 1 DROP(S): at 22:25

## 2024-10-22 RX ADMIN — Medication 975 MILLIGRAM(S): at 13:26

## 2024-10-22 RX ADMIN — Medication 40 MILLIEQUIVALENT(S): at 10:36

## 2024-10-22 RX ADMIN — Medication 975 MILLIGRAM(S): at 23:24

## 2024-10-22 RX ADMIN — POLYETHYLENE GLYCOL 3350 17 GRAM(S): 17 POWDER, FOR SOLUTION ORAL at 13:37

## 2024-10-22 RX ADMIN — HYDRALAZINE HYDROCHLORIDE 100 MILLIGRAM(S): 50 TABLET, FILM COATED ORAL at 06:20

## 2024-10-22 RX ADMIN — Medication 81 MILLIGRAM(S): at 13:28

## 2024-10-22 RX ADMIN — Medication 1 LOZENGE: at 06:25

## 2024-10-22 RX ADMIN — Medication 2 TABLET(S): at 22:24

## 2024-10-22 RX ADMIN — Medication 300 MILLIGRAM(S): at 18:30

## 2024-10-22 RX ADMIN — QUETIAPINE FUMARATE 200 MILLIGRAM(S): 200 TABLET ORAL at 18:30

## 2024-10-22 RX ADMIN — Medication 0.4 MILLIGRAM(S): at 22:26

## 2024-10-22 RX ADMIN — Medication 325 MILLIGRAM(S): at 13:28

## 2024-10-22 RX ADMIN — Medication 1 DROP(S): at 13:36

## 2024-10-22 RX ADMIN — Medication 975 MILLIGRAM(S): at 22:24

## 2024-10-22 RX ADMIN — Medication 2 MILLIGRAM(S): at 18:31

## 2024-10-22 RX ADMIN — Medication 2 MILLIGRAM(S): at 06:20

## 2024-10-22 RX ADMIN — Medication 1 DROP(S): at 06:25

## 2024-10-22 RX ADMIN — Medication 20 MILLIGRAM(S): at 18:30

## 2024-10-22 RX ADMIN — SODIUM CHLORIDE 125 MILLILITER(S): 9 INJECTION, SOLUTION INTRAMUSCULAR; INTRAVENOUS; SUBCUTANEOUS at 06:21

## 2024-10-23 LAB
ANION GAP SERPL CALC-SCNC: 8 MMOL/L — SIGNIFICANT CHANGE UP (ref 5–17)
BUN SERPL-MCNC: 7 MG/DL — SIGNIFICANT CHANGE UP (ref 7–23)
CALCIUM SERPL-MCNC: 8.4 MG/DL — LOW (ref 8.5–10.1)
CHLORIDE SERPL-SCNC: 104 MMOL/L — SIGNIFICANT CHANGE UP (ref 96–108)
CO2 SERPL-SCNC: 24 MMOL/L — SIGNIFICANT CHANGE UP (ref 22–31)
CREAT SERPL-MCNC: 0.56 MG/DL — SIGNIFICANT CHANGE UP (ref 0.5–1.3)
EGFR: 109 ML/MIN/1.73M2 — SIGNIFICANT CHANGE UP
GLUCOSE SERPL-MCNC: 114 MG/DL — HIGH (ref 70–99)
HCT VFR BLD CALC: 26.2 % — LOW (ref 39–50)
HGB BLD-MCNC: 9 G/DL — LOW (ref 13–17)
MCHC RBC-ENTMCNC: 25.8 PG — LOW (ref 27–34)
MCHC RBC-ENTMCNC: 34.4 G/DL — SIGNIFICANT CHANGE UP (ref 32–36)
MCV RBC AUTO: 75.1 FL — LOW (ref 80–100)
NRBC # BLD: 0 /100 WBCS — SIGNIFICANT CHANGE UP (ref 0–0)
PLATELET # BLD AUTO: 253 K/UL — SIGNIFICANT CHANGE UP (ref 150–400)
POTASSIUM SERPL-MCNC: 3.6 MMOL/L — SIGNIFICANT CHANGE UP (ref 3.5–5.3)
POTASSIUM SERPL-SCNC: 3.6 MMOL/L — SIGNIFICANT CHANGE UP (ref 3.5–5.3)
RBC # BLD: 3.49 M/UL — LOW (ref 4.2–5.8)
RBC # FLD: 15 % — HIGH (ref 10.3–14.5)
SODIUM SERPL-SCNC: 136 MMOL/L — SIGNIFICANT CHANGE UP (ref 135–145)
WBC # BLD: 9.6 K/UL — SIGNIFICANT CHANGE UP (ref 3.8–10.5)
WBC # FLD AUTO: 9.6 K/UL — SIGNIFICANT CHANGE UP (ref 3.8–10.5)

## 2024-10-23 PROCEDURE — 99232 SBSQ HOSP IP/OBS MODERATE 35: CPT

## 2024-10-23 RX ORDER — ONDANSETRON HYDROCHLORIDE 2 MG/ML
4 INJECTION, SOLUTION INTRAMUSCULAR; INTRAVENOUS ONCE
Refills: 0 | Status: COMPLETED | OUTPATIENT
Start: 2024-10-23 | End: 2024-10-23

## 2024-10-23 RX ADMIN — QUETIAPINE FUMARATE 200 MILLIGRAM(S): 200 TABLET ORAL at 05:46

## 2024-10-23 RX ADMIN — FOLIC ACID 1 MILLIGRAM(S): 1 TABLET ORAL at 14:17

## 2024-10-23 RX ADMIN — Medication 3 MILLIGRAM(S): at 21:50

## 2024-10-23 RX ADMIN — HYDRALAZINE HYDROCHLORIDE 100 MILLIGRAM(S): 50 TABLET, FILM COATED ORAL at 14:16

## 2024-10-23 RX ADMIN — Medication 300 MILLIGRAM(S): at 05:47

## 2024-10-23 RX ADMIN — POLYETHYLENE GLYCOL 3350 17 GRAM(S): 17 POWDER, FOR SOLUTION ORAL at 14:18

## 2024-10-23 RX ADMIN — Medication 1 DROP(S): at 21:56

## 2024-10-23 RX ADMIN — Medication 60 MILLIGRAM(S): at 05:47

## 2024-10-23 RX ADMIN — DIVALPROEX SODIUM 1000 MILLIGRAM(S): 250 TABLET, FILM COATED, EXTENDED RELEASE ORAL at 14:17

## 2024-10-23 RX ADMIN — HYDRALAZINE HYDROCHLORIDE 100 MILLIGRAM(S): 50 TABLET, FILM COATED ORAL at 21:50

## 2024-10-23 RX ADMIN — Medication 975 MILLIGRAM(S): at 05:49

## 2024-10-23 RX ADMIN — Medication 0.4 MILLIGRAM(S): at 21:50

## 2024-10-23 RX ADMIN — FINASTERIDE 5 MILLIGRAM(S): 5 TABLET, FILM COATED ORAL at 14:17

## 2024-10-23 RX ADMIN — Medication 975 MILLIGRAM(S): at 06:49

## 2024-10-23 RX ADMIN — Medication 300 MILLIGRAM(S): at 17:32

## 2024-10-23 RX ADMIN — Medication 975 MILLIGRAM(S): at 14:16

## 2024-10-23 RX ADMIN — SODIUM CHLORIDE 125 MILLILITER(S): 9 INJECTION, SOLUTION INTRAMUSCULAR; INTRAVENOUS; SUBCUTANEOUS at 17:37

## 2024-10-23 RX ADMIN — QUETIAPINE FUMARATE 200 MILLIGRAM(S): 200 TABLET ORAL at 17:32

## 2024-10-23 RX ADMIN — Medication 1 DROP(S): at 14:19

## 2024-10-23 RX ADMIN — Medication 975 MILLIGRAM(S): at 21:50

## 2024-10-23 RX ADMIN — Medication 2 MILLIGRAM(S): at 17:32

## 2024-10-23 RX ADMIN — HYDRALAZINE HYDROCHLORIDE 100 MILLIGRAM(S): 50 TABLET, FILM COATED ORAL at 05:47

## 2024-10-23 RX ADMIN — Medication 975 MILLIGRAM(S): at 15:15

## 2024-10-23 RX ADMIN — Medication 20 MILLIGRAM(S): at 17:32

## 2024-10-23 RX ADMIN — Medication 1 DROP(S): at 21:49

## 2024-10-23 RX ADMIN — Medication 1 LOZENGE: at 14:17

## 2024-10-23 RX ADMIN — Medication 1 LOZENGE: at 05:46

## 2024-10-23 RX ADMIN — Medication 81 MILLIGRAM(S): at 14:19

## 2024-10-23 RX ADMIN — Medication 1 DROP(S): at 05:48

## 2024-10-23 RX ADMIN — ONDANSETRON HYDROCHLORIDE 4 MILLIGRAM(S): 2 INJECTION, SOLUTION INTRAMUSCULAR; INTRAVENOUS at 23:21

## 2024-10-23 RX ADMIN — Medication 10 MILLIGRAM(S): at 21:50

## 2024-10-23 RX ADMIN — Medication 1 LOZENGE: at 21:50

## 2024-10-23 RX ADMIN — Medication 2 MILLIGRAM(S): at 05:46

## 2024-10-23 RX ADMIN — Medication 2 TABLET(S): at 21:54

## 2024-10-23 RX ADMIN — Medication 40 MILLIGRAM(S): at 17:31

## 2024-10-23 RX ADMIN — Medication 325 MILLIGRAM(S): at 14:17

## 2024-10-23 RX ADMIN — Medication 20 MILLIGRAM(S): at 05:47

## 2024-10-24 LAB
ALBUMIN SERPL ELPH-MCNC: 2.5 G/DL — LOW (ref 3.3–5)
ALP SERPL-CCNC: 55 U/L — SIGNIFICANT CHANGE UP (ref 40–120)
ALT FLD-CCNC: 52 U/L — SIGNIFICANT CHANGE UP (ref 12–78)
ANION GAP SERPL CALC-SCNC: 6 MMOL/L — SIGNIFICANT CHANGE UP (ref 5–17)
AST SERPL-CCNC: 63 U/L — HIGH (ref 15–37)
BILIRUB SERPL-MCNC: 0.5 MG/DL — SIGNIFICANT CHANGE UP (ref 0.2–1.2)
BUN SERPL-MCNC: 7 MG/DL — SIGNIFICANT CHANGE UP (ref 7–23)
CALCIUM SERPL-MCNC: 8.6 MG/DL — SIGNIFICANT CHANGE UP (ref 8.5–10.1)
CHLORIDE SERPL-SCNC: 102 MMOL/L — SIGNIFICANT CHANGE UP (ref 96–108)
CO2 SERPL-SCNC: 28 MMOL/L — SIGNIFICANT CHANGE UP (ref 22–31)
CREAT SERPL-MCNC: 0.61 MG/DL — SIGNIFICANT CHANGE UP (ref 0.5–1.3)
EGFR: 107 ML/MIN/1.73M2 — SIGNIFICANT CHANGE UP
GLUCOSE SERPL-MCNC: 94 MG/DL — SIGNIFICANT CHANGE UP (ref 70–99)
HCT VFR BLD CALC: 25.6 % — LOW (ref 39–50)
HGB BLD-MCNC: 8.7 G/DL — LOW (ref 13–17)
MAGNESIUM SERPL-MCNC: 1.3 MG/DL — LOW (ref 1.6–2.6)
MCHC RBC-ENTMCNC: 25.9 PG — LOW (ref 27–34)
MCHC RBC-ENTMCNC: 34 G/DL — SIGNIFICANT CHANGE UP (ref 32–36)
MCV RBC AUTO: 76.2 FL — LOW (ref 80–100)
NRBC # BLD: 0 /100 WBCS — SIGNIFICANT CHANGE UP (ref 0–0)
PHOSPHATE SERPL-MCNC: 2.9 MG/DL — SIGNIFICANT CHANGE UP (ref 2.5–4.5)
PLATELET # BLD AUTO: 275 K/UL — SIGNIFICANT CHANGE UP (ref 150–400)
POTASSIUM SERPL-MCNC: 3.7 MMOL/L — SIGNIFICANT CHANGE UP (ref 3.5–5.3)
POTASSIUM SERPL-SCNC: 3.7 MMOL/L — SIGNIFICANT CHANGE UP (ref 3.5–5.3)
PROT SERPL-MCNC: 5.7 GM/DL — LOW (ref 6–8.3)
RBC # BLD: 3.36 M/UL — LOW (ref 4.2–5.8)
RBC # FLD: 15.6 % — HIGH (ref 10.3–14.5)
SODIUM SERPL-SCNC: 136 MMOL/L — SIGNIFICANT CHANGE UP (ref 135–145)
WBC # BLD: 8.88 K/UL — SIGNIFICANT CHANGE UP (ref 3.8–10.5)
WBC # FLD AUTO: 8.88 K/UL — SIGNIFICANT CHANGE UP (ref 3.8–10.5)

## 2024-10-24 PROCEDURE — 99232 SBSQ HOSP IP/OBS MODERATE 35: CPT

## 2024-10-24 RX ORDER — MAGNESIUM SULFATE IN 0.9% NACL 2 G/50 ML
2 INTRAVENOUS SOLUTION, PIGGYBACK (ML) INTRAVENOUS ONCE
Refills: 0 | Status: COMPLETED | OUTPATIENT
Start: 2024-10-24 | End: 2024-10-24

## 2024-10-24 RX ORDER — POTASSIUM CHLORIDE 10 MEQ
40 TABLET, EXTENDED RELEASE ORAL ONCE
Refills: 0 | Status: COMPLETED | OUTPATIENT
Start: 2024-10-24 | End: 2024-10-24

## 2024-10-24 RX ADMIN — HYDRALAZINE HYDROCHLORIDE 100 MILLIGRAM(S): 50 TABLET, FILM COATED ORAL at 05:29

## 2024-10-24 RX ADMIN — Medication 1 DROP(S): at 21:24

## 2024-10-24 RX ADMIN — QUETIAPINE FUMARATE 200 MILLIGRAM(S): 200 TABLET ORAL at 17:21

## 2024-10-24 RX ADMIN — Medication 40 MILLIEQUIVALENT(S): at 22:01

## 2024-10-24 RX ADMIN — Medication 0.4 MILLIGRAM(S): at 21:23

## 2024-10-24 RX ADMIN — SODIUM CHLORIDE 125 MILLILITER(S): 9 INJECTION, SOLUTION INTRAMUSCULAR; INTRAVENOUS; SUBCUTANEOUS at 12:52

## 2024-10-24 RX ADMIN — POLYETHYLENE GLYCOL 3350 17 GRAM(S): 17 POWDER, FOR SOLUTION ORAL at 12:53

## 2024-10-24 RX ADMIN — Medication 975 MILLIGRAM(S): at 05:29

## 2024-10-24 RX ADMIN — Medication 1 DROP(S): at 05:34

## 2024-10-24 RX ADMIN — HYDRALAZINE HYDROCHLORIDE 100 MILLIGRAM(S): 50 TABLET, FILM COATED ORAL at 21:23

## 2024-10-24 RX ADMIN — Medication 3 MILLIGRAM(S): at 21:23

## 2024-10-24 RX ADMIN — Medication 20 MILLIGRAM(S): at 05:29

## 2024-10-24 RX ADMIN — Medication 300 MILLIGRAM(S): at 05:29

## 2024-10-24 RX ADMIN — Medication 1 LOZENGE: at 05:28

## 2024-10-24 RX ADMIN — Medication 25 GRAM(S): at 22:01

## 2024-10-24 RX ADMIN — FOLIC ACID 1 MILLIGRAM(S): 1 TABLET ORAL at 12:53

## 2024-10-24 RX ADMIN — Medication 1 LOZENGE: at 21:24

## 2024-10-24 RX ADMIN — Medication 60 MILLIGRAM(S): at 05:30

## 2024-10-24 RX ADMIN — Medication 325 MILLIGRAM(S): at 12:53

## 2024-10-24 RX ADMIN — FINASTERIDE 5 MILLIGRAM(S): 5 TABLET, FILM COATED ORAL at 12:53

## 2024-10-24 RX ADMIN — Medication 20 MILLIGRAM(S): at 17:21

## 2024-10-24 RX ADMIN — Medication 10 MILLIGRAM(S): at 21:23

## 2024-10-24 RX ADMIN — DIVALPROEX SODIUM 1000 MILLIGRAM(S): 250 TABLET, FILM COATED, EXTENDED RELEASE ORAL at 12:53

## 2024-10-24 RX ADMIN — QUETIAPINE FUMARATE 200 MILLIGRAM(S): 200 TABLET ORAL at 05:29

## 2024-10-24 RX ADMIN — Medication 2 MILLIGRAM(S): at 05:29

## 2024-10-24 RX ADMIN — Medication 2 MILLIGRAM(S): at 17:21

## 2024-10-24 RX ADMIN — Medication 975 MILLIGRAM(S): at 15:14

## 2024-10-24 RX ADMIN — Medication 40 MILLIGRAM(S): at 17:23

## 2024-10-24 RX ADMIN — Medication 300 MILLIGRAM(S): at 17:22

## 2024-10-24 RX ADMIN — Medication 81 MILLIGRAM(S): at 12:53

## 2024-10-25 LAB
ANION GAP SERPL CALC-SCNC: 4 MMOL/L — LOW (ref 5–17)
BUN SERPL-MCNC: 7 MG/DL — SIGNIFICANT CHANGE UP (ref 7–23)
CALCIUM SERPL-MCNC: 8.6 MG/DL — SIGNIFICANT CHANGE UP (ref 8.5–10.1)
CHLORIDE SERPL-SCNC: 100 MMOL/L — SIGNIFICANT CHANGE UP (ref 96–108)
CO2 SERPL-SCNC: 31 MMOL/L — SIGNIFICANT CHANGE UP (ref 22–31)
CREAT SERPL-MCNC: 0.63 MG/DL — SIGNIFICANT CHANGE UP (ref 0.5–1.3)
EGFR: 106 ML/MIN/1.73M2 — SIGNIFICANT CHANGE UP
GLUCOSE SERPL-MCNC: 106 MG/DL — HIGH (ref 70–99)
HCT VFR BLD CALC: 27 % — LOW (ref 39–50)
HGB BLD-MCNC: 9.1 G/DL — LOW (ref 13–17)
MCHC RBC-ENTMCNC: 25.8 PG — LOW (ref 27–34)
MCHC RBC-ENTMCNC: 33.7 G/DL — SIGNIFICANT CHANGE UP (ref 32–36)
MCV RBC AUTO: 76.5 FL — LOW (ref 80–100)
NRBC # BLD: 0 /100 WBCS — SIGNIFICANT CHANGE UP (ref 0–0)
PLATELET # BLD AUTO: 313 K/UL — SIGNIFICANT CHANGE UP (ref 150–400)
POTASSIUM SERPL-MCNC: 3.8 MMOL/L — SIGNIFICANT CHANGE UP (ref 3.5–5.3)
POTASSIUM SERPL-SCNC: 3.8 MMOL/L — SIGNIFICANT CHANGE UP (ref 3.5–5.3)
RBC # BLD: 3.53 M/UL — LOW (ref 4.2–5.8)
RBC # FLD: 16 % — HIGH (ref 10.3–14.5)
SODIUM SERPL-SCNC: 135 MMOL/L — SIGNIFICANT CHANGE UP (ref 135–145)
WBC # BLD: 12.14 K/UL — HIGH (ref 3.8–10.5)
WBC # FLD AUTO: 12.14 K/UL — HIGH (ref 3.8–10.5)

## 2024-10-25 PROCEDURE — 99232 SBSQ HOSP IP/OBS MODERATE 35: CPT

## 2024-10-25 RX ADMIN — Medication 975 MILLIGRAM(S): at 14:27

## 2024-10-25 RX ADMIN — Medication 1 DROP(S): at 06:05

## 2024-10-25 RX ADMIN — Medication 1 DROP(S): at 21:09

## 2024-10-25 RX ADMIN — Medication 20 MILLIGRAM(S): at 06:05

## 2024-10-25 RX ADMIN — SODIUM CHLORIDE 125 MILLILITER(S): 9 INJECTION, SOLUTION INTRAMUSCULAR; INTRAVENOUS; SUBCUTANEOUS at 11:43

## 2024-10-25 RX ADMIN — SODIUM CHLORIDE 125 MILLILITER(S): 9 INJECTION, SOLUTION INTRAMUSCULAR; INTRAVENOUS; SUBCUTANEOUS at 06:05

## 2024-10-25 RX ADMIN — Medication 1 LOZENGE: at 21:13

## 2024-10-25 RX ADMIN — Medication 300 MILLIGRAM(S): at 06:06

## 2024-10-25 RX ADMIN — Medication 20 MILLIGRAM(S): at 18:48

## 2024-10-25 RX ADMIN — QUETIAPINE FUMARATE 200 MILLIGRAM(S): 200 TABLET ORAL at 18:48

## 2024-10-25 RX ADMIN — FINASTERIDE 5 MILLIGRAM(S): 5 TABLET, FILM COATED ORAL at 11:45

## 2024-10-25 RX ADMIN — FOLIC ACID 1 MILLIGRAM(S): 1 TABLET ORAL at 11:45

## 2024-10-25 RX ADMIN — DIVALPROEX SODIUM 1000 MILLIGRAM(S): 250 TABLET, FILM COATED, EXTENDED RELEASE ORAL at 11:44

## 2024-10-25 RX ADMIN — Medication 40 MILLIGRAM(S): at 18:47

## 2024-10-25 RX ADMIN — Medication 300 MILLIGRAM(S): at 18:47

## 2024-10-25 RX ADMIN — Medication 1 LOZENGE: at 14:28

## 2024-10-25 RX ADMIN — HYDRALAZINE HYDROCHLORIDE 100 MILLIGRAM(S): 50 TABLET, FILM COATED ORAL at 14:27

## 2024-10-25 RX ADMIN — QUETIAPINE FUMARATE 200 MILLIGRAM(S): 200 TABLET ORAL at 06:05

## 2024-10-25 RX ADMIN — Medication 60 MILLIGRAM(S): at 06:06

## 2024-10-25 RX ADMIN — POLYETHYLENE GLYCOL 3350 17 GRAM(S): 17 POWDER, FOR SOLUTION ORAL at 11:46

## 2024-10-25 RX ADMIN — Medication 1 DROP(S): at 14:28

## 2024-10-25 RX ADMIN — Medication 975 MILLIGRAM(S): at 15:27

## 2024-10-25 RX ADMIN — Medication 1 LOZENGE: at 06:10

## 2024-10-25 RX ADMIN — Medication 2 MILLIGRAM(S): at 18:47

## 2024-10-25 RX ADMIN — Medication 2 MILLIGRAM(S): at 06:06

## 2024-10-25 RX ADMIN — Medication 325 MILLIGRAM(S): at 11:45

## 2024-10-25 RX ADMIN — Medication 3 MILLIGRAM(S): at 21:09

## 2024-10-25 RX ADMIN — HYDRALAZINE HYDROCHLORIDE 100 MILLIGRAM(S): 50 TABLET, FILM COATED ORAL at 21:09

## 2024-10-25 RX ADMIN — HYDRALAZINE HYDROCHLORIDE 100 MILLIGRAM(S): 50 TABLET, FILM COATED ORAL at 06:05

## 2024-10-25 RX ADMIN — SODIUM CHLORIDE 125 MILLILITER(S): 9 INJECTION, SOLUTION INTRAMUSCULAR; INTRAVENOUS; SUBCUTANEOUS at 21:11

## 2024-10-25 RX ADMIN — Medication 81 MILLIGRAM(S): at 11:47

## 2024-10-25 RX ADMIN — Medication 10 MILLIGRAM(S): at 21:09

## 2024-10-25 RX ADMIN — Medication 0.4 MILLIGRAM(S): at 21:09

## 2024-10-25 NOTE — CHART NOTE - NSCHARTNOTEFT_GEN_A_CORE
Hospitalist NP Note    Patient need rolling walker and 3:1 commode and shower chair to perform MRADLS at home.

## 2024-10-26 LAB
ANION GAP SERPL CALC-SCNC: 6 MMOL/L — SIGNIFICANT CHANGE UP (ref 5–17)
BUN SERPL-MCNC: 9 MG/DL — SIGNIFICANT CHANGE UP (ref 7–23)
CALCIUM SERPL-MCNC: 8.6 MG/DL — SIGNIFICANT CHANGE UP (ref 8.5–10.1)
CHLORIDE SERPL-SCNC: 100 MMOL/L — SIGNIFICANT CHANGE UP (ref 96–108)
CO2 SERPL-SCNC: 27 MMOL/L — SIGNIFICANT CHANGE UP (ref 22–31)
CREAT SERPL-MCNC: 0.55 MG/DL — SIGNIFICANT CHANGE UP (ref 0.5–1.3)
EGFR: 110 ML/MIN/1.73M2 — SIGNIFICANT CHANGE UP
GLUCOSE SERPL-MCNC: 104 MG/DL — HIGH (ref 70–99)
HCT VFR BLD CALC: 25.8 % — LOW (ref 39–50)
HGB BLD-MCNC: 8.8 G/DL — LOW (ref 13–17)
MCHC RBC-ENTMCNC: 26 PG — LOW (ref 27–34)
MCHC RBC-ENTMCNC: 34.1 G/DL — SIGNIFICANT CHANGE UP (ref 32–36)
MCV RBC AUTO: 76.1 FL — LOW (ref 80–100)
NRBC # BLD: 0 /100 WBCS — SIGNIFICANT CHANGE UP (ref 0–0)
PLATELET # BLD AUTO: 314 K/UL — SIGNIFICANT CHANGE UP (ref 150–400)
POTASSIUM SERPL-MCNC: 3.4 MMOL/L — LOW (ref 3.5–5.3)
POTASSIUM SERPL-SCNC: 3.4 MMOL/L — LOW (ref 3.5–5.3)
RBC # BLD: 3.39 M/UL — LOW (ref 4.2–5.8)
RBC # FLD: 15.9 % — HIGH (ref 10.3–14.5)
SODIUM SERPL-SCNC: 133 MMOL/L — LOW (ref 135–145)
WBC # BLD: 9.96 K/UL — SIGNIFICANT CHANGE UP (ref 3.8–10.5)
WBC # FLD AUTO: 9.96 K/UL — SIGNIFICANT CHANGE UP (ref 3.8–10.5)

## 2024-10-26 PROCEDURE — 99232 SBSQ HOSP IP/OBS MODERATE 35: CPT

## 2024-10-26 RX ORDER — GUAIFENESIN 100 MG/5ML
200 LIQUID ORAL EVERY 6 HOURS
Refills: 0 | Status: DISCONTINUED | OUTPATIENT
Start: 2024-10-26 | End: 2024-11-04

## 2024-10-26 RX ORDER — POTASSIUM CHLORIDE 10 MEQ
40 TABLET, EXTENDED RELEASE ORAL ONCE
Refills: 0 | Status: COMPLETED | OUTPATIENT
Start: 2024-10-26 | End: 2024-10-26

## 2024-10-26 RX ADMIN — Medication 975 MILLIGRAM(S): at 23:21

## 2024-10-26 RX ADMIN — Medication 10 MILLIGRAM(S): at 23:22

## 2024-10-26 RX ADMIN — FINASTERIDE 5 MILLIGRAM(S): 5 TABLET, FILM COATED ORAL at 13:17

## 2024-10-26 RX ADMIN — Medication 2 TABLET(S): at 23:23

## 2024-10-26 RX ADMIN — Medication 0.4 MILLIGRAM(S): at 23:21

## 2024-10-26 RX ADMIN — Medication 3 MILLIGRAM(S): at 23:22

## 2024-10-26 RX ADMIN — QUETIAPINE FUMARATE 200 MILLIGRAM(S): 200 TABLET ORAL at 05:51

## 2024-10-26 RX ADMIN — Medication 40 MILLIGRAM(S): at 17:56

## 2024-10-26 RX ADMIN — Medication 2 MILLIGRAM(S): at 17:56

## 2024-10-26 RX ADMIN — Medication 20 MILLIGRAM(S): at 17:57

## 2024-10-26 RX ADMIN — Medication 2 MILLIGRAM(S): at 05:50

## 2024-10-26 RX ADMIN — Medication 81 MILLIGRAM(S): at 13:17

## 2024-10-26 RX ADMIN — Medication 1 DROP(S): at 13:18

## 2024-10-26 RX ADMIN — QUETIAPINE FUMARATE 200 MILLIGRAM(S): 200 TABLET ORAL at 17:56

## 2024-10-26 RX ADMIN — Medication 300 MILLIGRAM(S): at 05:51

## 2024-10-26 RX ADMIN — Medication 1 LOZENGE: at 05:51

## 2024-10-26 RX ADMIN — Medication 325 MILLIGRAM(S): at 13:17

## 2024-10-26 RX ADMIN — Medication 40 MILLIEQUIVALENT(S): at 13:23

## 2024-10-26 RX ADMIN — Medication 1 DROP(S): at 05:50

## 2024-10-26 RX ADMIN — FOLIC ACID 1 MILLIGRAM(S): 1 TABLET ORAL at 13:18

## 2024-10-26 RX ADMIN — HYDRALAZINE HYDROCHLORIDE 100 MILLIGRAM(S): 50 TABLET, FILM COATED ORAL at 05:51

## 2024-10-26 RX ADMIN — HYDRALAZINE HYDROCHLORIDE 100 MILLIGRAM(S): 50 TABLET, FILM COATED ORAL at 23:22

## 2024-10-26 RX ADMIN — Medication 1 DROP(S): at 23:22

## 2024-10-26 RX ADMIN — Medication 300 MILLIGRAM(S): at 17:57

## 2024-10-26 RX ADMIN — HYDRALAZINE HYDROCHLORIDE 100 MILLIGRAM(S): 50 TABLET, FILM COATED ORAL at 13:16

## 2024-10-26 RX ADMIN — DIVALPROEX SODIUM 1000 MILLIGRAM(S): 250 TABLET, FILM COATED, EXTENDED RELEASE ORAL at 13:17

## 2024-10-26 RX ADMIN — Medication 20 MILLIGRAM(S): at 05:51

## 2024-10-26 RX ADMIN — Medication 1 DROP(S): at 23:23

## 2024-10-26 RX ADMIN — SODIUM CHLORIDE 125 MILLILITER(S): 9 INJECTION, SOLUTION INTRAMUSCULAR; INTRAVENOUS; SUBCUTANEOUS at 05:51

## 2024-10-26 RX ADMIN — Medication 60 MILLIGRAM(S): at 05:50

## 2024-10-27 LAB
ANION GAP SERPL CALC-SCNC: 7 MMOL/L — SIGNIFICANT CHANGE UP (ref 5–17)
BUN SERPL-MCNC: 10 MG/DL — SIGNIFICANT CHANGE UP (ref 7–23)
CALCIUM SERPL-MCNC: 8.8 MG/DL — SIGNIFICANT CHANGE UP (ref 8.5–10.1)
CHLORIDE SERPL-SCNC: 99 MMOL/L — SIGNIFICANT CHANGE UP (ref 96–108)
CO2 SERPL-SCNC: 27 MMOL/L — SIGNIFICANT CHANGE UP (ref 22–31)
CREAT SERPL-MCNC: 0.56 MG/DL — SIGNIFICANT CHANGE UP (ref 0.5–1.3)
EGFR: 109 ML/MIN/1.73M2 — SIGNIFICANT CHANGE UP
GLUCOSE SERPL-MCNC: 96 MG/DL — SIGNIFICANT CHANGE UP (ref 70–99)
HCT VFR BLD CALC: 28.3 % — LOW (ref 39–50)
HGB BLD-MCNC: 9.5 G/DL — LOW (ref 13–17)
MCHC RBC-ENTMCNC: 25.7 PG — LOW (ref 27–34)
MCHC RBC-ENTMCNC: 33.6 G/DL — SIGNIFICANT CHANGE UP (ref 32–36)
MCV RBC AUTO: 76.7 FL — LOW (ref 80–100)
NRBC # BLD: 0 /100 WBCS — SIGNIFICANT CHANGE UP (ref 0–0)
PLATELET # BLD AUTO: 372 K/UL — SIGNIFICANT CHANGE UP (ref 150–400)
POTASSIUM SERPL-MCNC: 3.5 MMOL/L — SIGNIFICANT CHANGE UP (ref 3.5–5.3)
POTASSIUM SERPL-SCNC: 3.5 MMOL/L — SIGNIFICANT CHANGE UP (ref 3.5–5.3)
RBC # BLD: 3.69 M/UL — LOW (ref 4.2–5.8)
RBC # FLD: 16.3 % — HIGH (ref 10.3–14.5)
SODIUM SERPL-SCNC: 133 MMOL/L — LOW (ref 135–145)
WBC # BLD: 10.17 K/UL — SIGNIFICANT CHANGE UP (ref 3.8–10.5)
WBC # FLD AUTO: 10.17 K/UL — SIGNIFICANT CHANGE UP (ref 3.8–10.5)

## 2024-10-27 PROCEDURE — 99232 SBSQ HOSP IP/OBS MODERATE 35: CPT

## 2024-10-27 RX ADMIN — Medication 20 MILLIGRAM(S): at 19:05

## 2024-10-27 RX ADMIN — Medication 3 MILLIGRAM(S): at 21:50

## 2024-10-27 RX ADMIN — Medication 325 MILLIGRAM(S): at 12:16

## 2024-10-27 RX ADMIN — Medication 975 MILLIGRAM(S): at 05:10

## 2024-10-27 RX ADMIN — Medication 975 MILLIGRAM(S): at 06:10

## 2024-10-27 RX ADMIN — Medication 0.4 MILLIGRAM(S): at 21:50

## 2024-10-27 RX ADMIN — Medication 1 DROP(S): at 19:00

## 2024-10-27 RX ADMIN — HYDRALAZINE HYDROCHLORIDE 100 MILLIGRAM(S): 50 TABLET, FILM COATED ORAL at 05:10

## 2024-10-27 RX ADMIN — Medication 975 MILLIGRAM(S): at 16:16

## 2024-10-27 RX ADMIN — Medication 2 MILLIGRAM(S): at 19:03

## 2024-10-27 RX ADMIN — Medication 60 MILLIGRAM(S): at 05:10

## 2024-10-27 RX ADMIN — HYDRALAZINE HYDROCHLORIDE 100 MILLIGRAM(S): 50 TABLET, FILM COATED ORAL at 16:17

## 2024-10-27 RX ADMIN — Medication 1 DROP(S): at 21:50

## 2024-10-27 RX ADMIN — Medication 2 MILLIGRAM(S): at 05:10

## 2024-10-27 RX ADMIN — FINASTERIDE 5 MILLIGRAM(S): 5 TABLET, FILM COATED ORAL at 12:16

## 2024-10-27 RX ADMIN — QUETIAPINE FUMARATE 200 MILLIGRAM(S): 200 TABLET ORAL at 19:05

## 2024-10-27 RX ADMIN — QUETIAPINE FUMARATE 200 MILLIGRAM(S): 200 TABLET ORAL at 05:10

## 2024-10-27 RX ADMIN — POLYETHYLENE GLYCOL 3350 17 GRAM(S): 17 POWDER, FOR SOLUTION ORAL at 12:17

## 2024-10-27 RX ADMIN — Medication 1 LOZENGE: at 16:17

## 2024-10-27 RX ADMIN — Medication 81 MILLIGRAM(S): at 12:17

## 2024-10-27 RX ADMIN — Medication 975 MILLIGRAM(S): at 00:21

## 2024-10-27 RX ADMIN — Medication 300 MILLIGRAM(S): at 05:10

## 2024-10-27 RX ADMIN — Medication 20 MILLIGRAM(S): at 05:09

## 2024-10-27 RX ADMIN — Medication 40 MILLIGRAM(S): at 19:03

## 2024-10-27 RX ADMIN — HYDRALAZINE HYDROCHLORIDE 100 MILLIGRAM(S): 50 TABLET, FILM COATED ORAL at 21:50

## 2024-10-27 RX ADMIN — FOLIC ACID 1 MILLIGRAM(S): 1 TABLET ORAL at 12:17

## 2024-10-27 RX ADMIN — Medication 300 MILLIGRAM(S): at 19:04

## 2024-10-27 RX ADMIN — DIVALPROEX SODIUM 1000 MILLIGRAM(S): 250 TABLET, FILM COATED, EXTENDED RELEASE ORAL at 12:16

## 2024-10-27 RX ADMIN — Medication 1 LOZENGE: at 21:50

## 2024-10-27 RX ADMIN — Medication 10 MILLIGRAM(S): at 21:50

## 2024-10-28 LAB
ANION GAP SERPL CALC-SCNC: 6 MMOL/L — SIGNIFICANT CHANGE UP (ref 5–17)
BUN SERPL-MCNC: 12 MG/DL — SIGNIFICANT CHANGE UP (ref 7–23)
CALCIUM SERPL-MCNC: 9.1 MG/DL — SIGNIFICANT CHANGE UP (ref 8.5–10.1)
CHLORIDE SERPL-SCNC: 97 MMOL/L — SIGNIFICANT CHANGE UP (ref 96–108)
CO2 SERPL-SCNC: 29 MMOL/L — SIGNIFICANT CHANGE UP (ref 22–31)
CREAT SERPL-MCNC: 0.58 MG/DL — SIGNIFICANT CHANGE UP (ref 0.5–1.3)
EGFR: 108 ML/MIN/1.73M2 — SIGNIFICANT CHANGE UP
GLUCOSE SERPL-MCNC: 108 MG/DL — HIGH (ref 70–99)
HCT VFR BLD CALC: 29.3 % — LOW (ref 39–50)
HGB BLD-MCNC: 9.8 G/DL — LOW (ref 13–17)
MCHC RBC-ENTMCNC: 25.5 PG — LOW (ref 27–34)
MCHC RBC-ENTMCNC: 33.4 G/DL — SIGNIFICANT CHANGE UP (ref 32–36)
MCV RBC AUTO: 76.1 FL — LOW (ref 80–100)
NRBC # BLD: 0 /100 WBCS — SIGNIFICANT CHANGE UP (ref 0–0)
PLATELET # BLD AUTO: 417 K/UL — HIGH (ref 150–400)
POTASSIUM SERPL-MCNC: 3.5 MMOL/L — SIGNIFICANT CHANGE UP (ref 3.5–5.3)
POTASSIUM SERPL-SCNC: 3.5 MMOL/L — SIGNIFICANT CHANGE UP (ref 3.5–5.3)
RBC # BLD: 3.85 M/UL — LOW (ref 4.2–5.8)
RBC # FLD: 16.6 % — HIGH (ref 10.3–14.5)
SODIUM SERPL-SCNC: 132 MMOL/L — LOW (ref 135–145)
WBC # BLD: 11.13 K/UL — HIGH (ref 3.8–10.5)
WBC # FLD AUTO: 11.13 K/UL — HIGH (ref 3.8–10.5)

## 2024-10-28 PROCEDURE — 99232 SBSQ HOSP IP/OBS MODERATE 35: CPT

## 2024-10-28 RX ADMIN — QUETIAPINE FUMARATE 200 MILLIGRAM(S): 200 TABLET ORAL at 18:22

## 2024-10-28 RX ADMIN — FINASTERIDE 5 MILLIGRAM(S): 5 TABLET, FILM COATED ORAL at 13:06

## 2024-10-28 RX ADMIN — Medication 975 MILLIGRAM(S): at 21:33

## 2024-10-28 RX ADMIN — Medication 1 DROP(S): at 21:33

## 2024-10-28 RX ADMIN — QUETIAPINE FUMARATE 200 MILLIGRAM(S): 200 TABLET ORAL at 05:43

## 2024-10-28 RX ADMIN — Medication 20 MILLIGRAM(S): at 05:43

## 2024-10-28 RX ADMIN — HYDRALAZINE HYDROCHLORIDE 100 MILLIGRAM(S): 50 TABLET, FILM COATED ORAL at 13:07

## 2024-10-28 RX ADMIN — Medication 325 MILLIGRAM(S): at 13:07

## 2024-10-28 RX ADMIN — HYDRALAZINE HYDROCHLORIDE 100 MILLIGRAM(S): 50 TABLET, FILM COATED ORAL at 21:37

## 2024-10-28 RX ADMIN — Medication 1 LOZENGE: at 13:07

## 2024-10-28 RX ADMIN — Medication 2 MILLIGRAM(S): at 18:23

## 2024-10-28 RX ADMIN — Medication 81 MILLIGRAM(S): at 13:06

## 2024-10-28 RX ADMIN — Medication 1 DROP(S): at 05:42

## 2024-10-28 RX ADMIN — Medication 1 LOZENGE: at 05:43

## 2024-10-28 RX ADMIN — Medication 1 DROP(S): at 13:06

## 2024-10-28 RX ADMIN — Medication 3 MILLIGRAM(S): at 21:38

## 2024-10-28 RX ADMIN — FOLIC ACID 1 MILLIGRAM(S): 1 TABLET ORAL at 13:06

## 2024-10-28 RX ADMIN — Medication 40 MILLIGRAM(S): at 18:22

## 2024-10-28 RX ADMIN — Medication 20 MILLIGRAM(S): at 18:23

## 2024-10-28 RX ADMIN — Medication 10 MILLIGRAM(S): at 21:33

## 2024-10-28 RX ADMIN — Medication 2 MILLIGRAM(S): at 05:42

## 2024-10-28 RX ADMIN — Medication 0.4 MILLIGRAM(S): at 21:33

## 2024-10-28 RX ADMIN — DIVALPROEX SODIUM 1000 MILLIGRAM(S): 250 TABLET, FILM COATED, EXTENDED RELEASE ORAL at 13:05

## 2024-10-28 RX ADMIN — Medication 300 MILLIGRAM(S): at 05:43

## 2024-10-28 RX ADMIN — Medication 2 TABLET(S): at 21:33

## 2024-10-28 RX ADMIN — HYDRALAZINE HYDROCHLORIDE 100 MILLIGRAM(S): 50 TABLET, FILM COATED ORAL at 05:43

## 2024-10-28 RX ADMIN — Medication 300 MILLIGRAM(S): at 18:22

## 2024-10-28 RX ADMIN — Medication 60 MILLIGRAM(S): at 05:43

## 2024-10-28 RX ADMIN — Medication 975 MILLIGRAM(S): at 22:33

## 2024-10-28 NOTE — CHART NOTE - NSCHARTNOTEFT_GEN_A_CORE
Pt presented from group home s/p fall; found c right hip fx; now s/p right hip IMN (10/19); d/c plan in progress.    Factors impacting intake: [ X] none [ ] nausea  [ ] vomiting [ ] diarrhea [ ] constipation  [ ]chewing problems [ ] swallowing issues  [ ] other:     Diet Prescription: Easy to Chew; low Na c Ensure Plus High Protein x 2/day (provides 700 kcal, 40 g protein)  (10/21)    Intake:  pt consuming 51-75% of most meals; drinking some of supplement    Current Weight:   78 kg (10/24); admission st 77.5 kg (10/19)  % Weight Change:  wt stable x 5 days    no edema noted    Pertinent Medications: MEDICATIONS  (STANDING):  acetaminophen     Tablet .. 975 milliGRAM(s) Oral every 8 hours  artificial  tears Solution 1 Drop(s) Both EYES three times a day  aspirin  chewable 81 milliGRAM(s) Oral daily  atorvastatin 10 milliGRAM(s) Oral at bedtime  benzocaine/menthol Lozenge 1 Lozenge Oral three times a day  benztropine 2 milliGRAM(s) Oral two times a day  divalproex ER 1000 milliGRAM(s) Oral daily  enoxaparin Injectable 40 milliGRAM(s) SubCutaneous every 24 hours  ferrous    sulfate 325 milliGRAM(s) Oral daily  finasteride 5 milliGRAM(s) Oral daily  folic acid 1 milliGRAM(s) Oral daily  hydrALAZINE 100 milliGRAM(s) Oral three times a day  influenza  Vaccine (HIGH DOSE) 0.5 milliLiter(s) IntraMuscular once  labetalol 300 milliGRAM(s) Oral two times a day  latanoprost 0.005% Ophthalmic Solution 1 Drop(s) Both EYES at bedtime  lisinopril 20 milliGRAM(s) Oral two times a day  melatonin 3 milliGRAM(s) Oral at bedtime  NIFEdipine XL 60 milliGRAM(s) Oral daily  polyethylene glycol 3350 17 Gram(s) Oral daily  QUEtiapine 200 milliGRAM(s) Oral two times a day  senna 2 Tablet(s) Oral at bedtime  tamsulosin 0.4 milliGRAM(s) Oral at bedtime    MEDICATIONS  (PRN):  guaiFENesin Oral Liquid (Sugar-Free) 200 milliGRAM(s) Oral every 6 hours PRN Cough    Pertinent Labs: 10-28 Na132 mmol/L[L] Glu 108 mg/dL[H] K+ 3.5 mmol/L Cr  0.58 mg/dL BUN 12 mg/dL 10-24 Phos 2.9 mg/dL 10-24 Alb 2.5 g/dL[L]    Skin: no pressure ulcers noted    Estimated Needs:   [ X] no change since previous assessment  (10/21)  [ ] recalculated:     Previous Nutrition Diagnosis:   [X ]   Moderate Malnutrition in context of chronic illness  Etiology:  Inadequate energy/protein intake related to autism, current smoker  Signs & Symptoms:  physical findings of severe/moderate muscle wasting & moderate fat depletion as noted on 10/21    GOAL:  Pt to consume >75% meals/supplements during LOS - not met at this time    Nutrition Diagnosis is [X ] ongoing  [ ] resolved [ ] not applicable     New Nutrition Diagnosis: [X ] not applicable      Interventions:   continue current diet rx as noted  Recommend  [ ] Change Diet To:  [ ] Nutrition Supplement  [ ] Nutrition Support  [ ] Other:     Monitoring and Evaluation:   [ X] PO intake [ x ] Tolerance to diet prescription [ x ] weights [ x ] labs[ x ] follow up per protocol  [ ] other:

## 2024-10-29 LAB
ANION GAP SERPL CALC-SCNC: 8 MMOL/L — SIGNIFICANT CHANGE UP (ref 5–17)
BUN SERPL-MCNC: 14 MG/DL — SIGNIFICANT CHANGE UP (ref 7–23)
CALCIUM SERPL-MCNC: 8.8 MG/DL — SIGNIFICANT CHANGE UP (ref 8.5–10.1)
CHLORIDE SERPL-SCNC: 98 MMOL/L — SIGNIFICANT CHANGE UP (ref 96–108)
CO2 SERPL-SCNC: 27 MMOL/L — SIGNIFICANT CHANGE UP (ref 22–31)
CREAT SERPL-MCNC: 0.63 MG/DL — SIGNIFICANT CHANGE UP (ref 0.5–1.3)
EGFR: 106 ML/MIN/1.73M2 — SIGNIFICANT CHANGE UP
GLUCOSE SERPL-MCNC: 94 MG/DL — SIGNIFICANT CHANGE UP (ref 70–99)
HCT VFR BLD CALC: 26.8 % — LOW (ref 39–50)
HGB BLD-MCNC: 9 G/DL — LOW (ref 13–17)
MCHC RBC-ENTMCNC: 25.9 PG — LOW (ref 27–34)
MCHC RBC-ENTMCNC: 33.6 G/DL — SIGNIFICANT CHANGE UP (ref 32–36)
MCV RBC AUTO: 77 FL — LOW (ref 80–100)
NRBC # BLD: 0 /100 WBCS — SIGNIFICANT CHANGE UP (ref 0–0)
PLATELET # BLD AUTO: 399 K/UL — SIGNIFICANT CHANGE UP (ref 150–400)
POTASSIUM SERPL-MCNC: 3.6 MMOL/L — SIGNIFICANT CHANGE UP (ref 3.5–5.3)
POTASSIUM SERPL-SCNC: 3.6 MMOL/L — SIGNIFICANT CHANGE UP (ref 3.5–5.3)
RBC # BLD: 3.48 M/UL — LOW (ref 4.2–5.8)
RBC # FLD: 16.9 % — HIGH (ref 10.3–14.5)
SODIUM SERPL-SCNC: 133 MMOL/L — LOW (ref 135–145)
WBC # BLD: 15.04 K/UL — HIGH (ref 3.8–10.5)
WBC # FLD AUTO: 15.04 K/UL — HIGH (ref 3.8–10.5)

## 2024-10-29 PROCEDURE — 99232 SBSQ HOSP IP/OBS MODERATE 35: CPT

## 2024-10-29 RX ADMIN — Medication 975 MILLIGRAM(S): at 05:28

## 2024-10-29 RX ADMIN — Medication 300 MILLIGRAM(S): at 17:31

## 2024-10-29 RX ADMIN — Medication 1 LOZENGE: at 05:28

## 2024-10-29 RX ADMIN — Medication 1 DROP(S): at 05:29

## 2024-10-29 RX ADMIN — Medication 1 DROP(S): at 13:46

## 2024-10-29 RX ADMIN — Medication 1 LOZENGE: at 13:46

## 2024-10-29 RX ADMIN — FOLIC ACID 1 MILLIGRAM(S): 1 TABLET ORAL at 13:39

## 2024-10-29 RX ADMIN — POLYETHYLENE GLYCOL 3350 17 GRAM(S): 17 POWDER, FOR SOLUTION ORAL at 13:40

## 2024-10-29 RX ADMIN — Medication 975 MILLIGRAM(S): at 22:11

## 2024-10-29 RX ADMIN — Medication 3 MILLIGRAM(S): at 22:12

## 2024-10-29 RX ADMIN — Medication 81 MILLIGRAM(S): at 13:40

## 2024-10-29 RX ADMIN — Medication 10 MILLIGRAM(S): at 22:11

## 2024-10-29 RX ADMIN — FINASTERIDE 5 MILLIGRAM(S): 5 TABLET, FILM COATED ORAL at 13:39

## 2024-10-29 RX ADMIN — Medication 2 MILLIGRAM(S): at 17:31

## 2024-10-29 RX ADMIN — QUETIAPINE FUMARATE 200 MILLIGRAM(S): 200 TABLET ORAL at 17:32

## 2024-10-29 RX ADMIN — Medication 0.4 MILLIGRAM(S): at 22:11

## 2024-10-29 RX ADMIN — Medication 300 MILLIGRAM(S): at 05:28

## 2024-10-29 RX ADMIN — Medication 40 MILLIGRAM(S): at 17:25

## 2024-10-29 RX ADMIN — QUETIAPINE FUMARATE 200 MILLIGRAM(S): 200 TABLET ORAL at 05:29

## 2024-10-29 RX ADMIN — Medication 2 MILLIGRAM(S): at 05:29

## 2024-10-29 RX ADMIN — Medication 1 DROP(S): at 22:12

## 2024-10-29 RX ADMIN — Medication 975 MILLIGRAM(S): at 06:28

## 2024-10-29 RX ADMIN — Medication 1 LOZENGE: at 22:11

## 2024-10-29 RX ADMIN — Medication 20 MILLIGRAM(S): at 17:32

## 2024-10-29 RX ADMIN — DIVALPROEX SODIUM 1000 MILLIGRAM(S): 250 TABLET, FILM COATED, EXTENDED RELEASE ORAL at 13:40

## 2024-10-29 RX ADMIN — Medication 975 MILLIGRAM(S): at 13:39

## 2024-10-29 RX ADMIN — HYDRALAZINE HYDROCHLORIDE 100 MILLIGRAM(S): 50 TABLET, FILM COATED ORAL at 05:29

## 2024-10-29 RX ADMIN — Medication 325 MILLIGRAM(S): at 13:39

## 2024-10-29 RX ADMIN — Medication 20 MILLIGRAM(S): at 05:29

## 2024-10-29 RX ADMIN — Medication 60 MILLIGRAM(S): at 05:28

## 2024-10-29 RX ADMIN — HYDRALAZINE HYDROCHLORIDE 100 MILLIGRAM(S): 50 TABLET, FILM COATED ORAL at 14:49

## 2024-10-29 RX ADMIN — Medication 975 MILLIGRAM(S): at 14:39

## 2024-10-29 RX ADMIN — HYDRALAZINE HYDROCHLORIDE 100 MILLIGRAM(S): 50 TABLET, FILM COATED ORAL at 22:12

## 2024-10-29 RX ADMIN — Medication 975 MILLIGRAM(S): at 23:11

## 2024-10-30 LAB
ANION GAP SERPL CALC-SCNC: 9 MMOL/L — SIGNIFICANT CHANGE UP (ref 5–17)
BUN SERPL-MCNC: 15 MG/DL — SIGNIFICANT CHANGE UP (ref 7–23)
CALCIUM SERPL-MCNC: 8.7 MG/DL — SIGNIFICANT CHANGE UP (ref 8.5–10.1)
CHLORIDE SERPL-SCNC: 99 MMOL/L — SIGNIFICANT CHANGE UP (ref 96–108)
CO2 SERPL-SCNC: 24 MMOL/L — SIGNIFICANT CHANGE UP (ref 22–31)
CREAT SERPL-MCNC: 0.59 MG/DL — SIGNIFICANT CHANGE UP (ref 0.5–1.3)
EGFR: 108 ML/MIN/1.73M2 — SIGNIFICANT CHANGE UP
GLUCOSE SERPL-MCNC: 92 MG/DL — SIGNIFICANT CHANGE UP (ref 70–99)
HCT VFR BLD CALC: 26.4 % — LOW (ref 39–50)
HGB BLD-MCNC: 8.8 G/DL — LOW (ref 13–17)
MCHC RBC-ENTMCNC: 25.7 PG — LOW (ref 27–34)
MCHC RBC-ENTMCNC: 33.3 G/DL — SIGNIFICANT CHANGE UP (ref 32–36)
MCV RBC AUTO: 77.2 FL — LOW (ref 80–100)
NRBC # BLD: 0 /100 WBCS — SIGNIFICANT CHANGE UP (ref 0–0)
PLATELET # BLD AUTO: 388 K/UL — SIGNIFICANT CHANGE UP (ref 150–400)
POTASSIUM SERPL-MCNC: 3.8 MMOL/L — SIGNIFICANT CHANGE UP (ref 3.5–5.3)
POTASSIUM SERPL-SCNC: 3.8 MMOL/L — SIGNIFICANT CHANGE UP (ref 3.5–5.3)
RBC # BLD: 3.42 M/UL — LOW (ref 4.2–5.8)
RBC # FLD: 16.5 % — HIGH (ref 10.3–14.5)
SODIUM SERPL-SCNC: 132 MMOL/L — LOW (ref 135–145)
WBC # BLD: 9.24 K/UL — SIGNIFICANT CHANGE UP (ref 3.8–10.5)
WBC # FLD AUTO: 9.24 K/UL — SIGNIFICANT CHANGE UP (ref 3.8–10.5)

## 2024-10-30 PROCEDURE — 99232 SBSQ HOSP IP/OBS MODERATE 35: CPT

## 2024-10-30 RX ADMIN — DIVALPROEX SODIUM 1000 MILLIGRAM(S): 250 TABLET, FILM COATED, EXTENDED RELEASE ORAL at 13:16

## 2024-10-30 RX ADMIN — FOLIC ACID 1 MILLIGRAM(S): 1 TABLET ORAL at 13:16

## 2024-10-30 RX ADMIN — Medication 975 MILLIGRAM(S): at 21:28

## 2024-10-30 RX ADMIN — Medication 2 MILLIGRAM(S): at 17:00

## 2024-10-30 RX ADMIN — Medication 300 MILLIGRAM(S): at 17:00

## 2024-10-30 RX ADMIN — Medication 1 LOZENGE: at 13:14

## 2024-10-30 RX ADMIN — Medication 975 MILLIGRAM(S): at 22:28

## 2024-10-30 RX ADMIN — Medication 300 MILLIGRAM(S): at 06:33

## 2024-10-30 RX ADMIN — QUETIAPINE FUMARATE 200 MILLIGRAM(S): 200 TABLET ORAL at 17:00

## 2024-10-30 RX ADMIN — Medication 975 MILLIGRAM(S): at 06:41

## 2024-10-30 RX ADMIN — Medication 975 MILLIGRAM(S): at 13:14

## 2024-10-30 RX ADMIN — Medication 1 LOZENGE: at 21:30

## 2024-10-30 RX ADMIN — Medication 20 MILLIGRAM(S): at 17:00

## 2024-10-30 RX ADMIN — FINASTERIDE 5 MILLIGRAM(S): 5 TABLET, FILM COATED ORAL at 13:16

## 2024-10-30 RX ADMIN — Medication 1 DROP(S): at 06:35

## 2024-10-30 RX ADMIN — Medication 40 MILLIGRAM(S): at 17:00

## 2024-10-30 RX ADMIN — Medication 81 MILLIGRAM(S): at 13:17

## 2024-10-30 RX ADMIN — Medication 3 MILLIGRAM(S): at 21:29

## 2024-10-30 RX ADMIN — Medication 60 MILLIGRAM(S): at 06:34

## 2024-10-30 RX ADMIN — Medication 975 MILLIGRAM(S): at 14:10

## 2024-10-30 RX ADMIN — HYDRALAZINE HYDROCHLORIDE 100 MILLIGRAM(S): 50 TABLET, FILM COATED ORAL at 13:17

## 2024-10-30 RX ADMIN — Medication 1 DROP(S): at 21:29

## 2024-10-30 RX ADMIN — HYDRALAZINE HYDROCHLORIDE 100 MILLIGRAM(S): 50 TABLET, FILM COATED ORAL at 06:33

## 2024-10-30 RX ADMIN — Medication 20 MILLIGRAM(S): at 06:34

## 2024-10-30 RX ADMIN — Medication 325 MILLIGRAM(S): at 13:15

## 2024-10-30 RX ADMIN — QUETIAPINE FUMARATE 200 MILLIGRAM(S): 200 TABLET ORAL at 06:33

## 2024-10-30 RX ADMIN — Medication 1 LOZENGE: at 06:36

## 2024-10-30 RX ADMIN — Medication 2 MILLIGRAM(S): at 06:34

## 2024-10-30 RX ADMIN — HYDRALAZINE HYDROCHLORIDE 100 MILLIGRAM(S): 50 TABLET, FILM COATED ORAL at 21:33

## 2024-10-30 RX ADMIN — Medication 1 DROP(S): at 13:14

## 2024-10-30 RX ADMIN — Medication 0.4 MILLIGRAM(S): at 21:29

## 2024-10-30 RX ADMIN — Medication 10 MILLIGRAM(S): at 21:29

## 2024-10-30 RX ADMIN — Medication 1 DROP(S): at 21:30

## 2024-10-31 LAB
ANION GAP SERPL CALC-SCNC: 9 MMOL/L — SIGNIFICANT CHANGE UP (ref 5–17)
BUN SERPL-MCNC: 12 MG/DL — SIGNIFICANT CHANGE UP (ref 7–23)
CALCIUM SERPL-MCNC: 9.2 MG/DL — SIGNIFICANT CHANGE UP (ref 8.5–10.1)
CHLORIDE SERPL-SCNC: 98 MMOL/L — SIGNIFICANT CHANGE UP (ref 96–108)
CO2 SERPL-SCNC: 26 MMOL/L — SIGNIFICANT CHANGE UP (ref 22–31)
CREAT SERPL-MCNC: 0.67 MG/DL — SIGNIFICANT CHANGE UP (ref 0.5–1.3)
EGFR: 104 ML/MIN/1.73M2 — SIGNIFICANT CHANGE UP
GLUCOSE SERPL-MCNC: 100 MG/DL — HIGH (ref 70–99)
HCT VFR BLD CALC: 28.6 % — LOW (ref 39–50)
HGB BLD-MCNC: 9.4 G/DL — LOW (ref 13–17)
MCHC RBC-ENTMCNC: 25.7 PG — LOW (ref 27–34)
MCHC RBC-ENTMCNC: 32.9 G/DL — SIGNIFICANT CHANGE UP (ref 32–36)
MCV RBC AUTO: 78.1 FL — LOW (ref 80–100)
NRBC # BLD: 0 /100 WBCS — SIGNIFICANT CHANGE UP (ref 0–0)
PLATELET # BLD AUTO: 433 K/UL — HIGH (ref 150–400)
POTASSIUM SERPL-MCNC: 4 MMOL/L — SIGNIFICANT CHANGE UP (ref 3.5–5.3)
POTASSIUM SERPL-SCNC: 4 MMOL/L — SIGNIFICANT CHANGE UP (ref 3.5–5.3)
RBC # BLD: 3.66 M/UL — LOW (ref 4.2–5.8)
RBC # FLD: 17 % — HIGH (ref 10.3–14.5)
SODIUM SERPL-SCNC: 133 MMOL/L — LOW (ref 135–145)
WBC # BLD: 8.16 K/UL — SIGNIFICANT CHANGE UP (ref 3.8–10.5)
WBC # FLD AUTO: 8.16 K/UL — SIGNIFICANT CHANGE UP (ref 3.8–10.5)

## 2024-10-31 PROCEDURE — 99232 SBSQ HOSP IP/OBS MODERATE 35: CPT

## 2024-10-31 RX ADMIN — Medication 2 MILLIGRAM(S): at 05:44

## 2024-10-31 RX ADMIN — HYDRALAZINE HYDROCHLORIDE 100 MILLIGRAM(S): 50 TABLET, FILM COATED ORAL at 13:57

## 2024-10-31 RX ADMIN — FINASTERIDE 5 MILLIGRAM(S): 5 TABLET, FILM COATED ORAL at 13:53

## 2024-10-31 RX ADMIN — Medication 1 DROP(S): at 22:27

## 2024-10-31 RX ADMIN — Medication 60 MILLIGRAM(S): at 05:44

## 2024-10-31 RX ADMIN — QUETIAPINE FUMARATE 200 MILLIGRAM(S): 200 TABLET ORAL at 18:34

## 2024-10-31 RX ADMIN — Medication 300 MILLIGRAM(S): at 18:34

## 2024-10-31 RX ADMIN — DIVALPROEX SODIUM 1000 MILLIGRAM(S): 250 TABLET, FILM COATED, EXTENDED RELEASE ORAL at 13:54

## 2024-10-31 RX ADMIN — Medication 20 MILLIGRAM(S): at 05:45

## 2024-10-31 RX ADMIN — Medication 975 MILLIGRAM(S): at 05:44

## 2024-10-31 RX ADMIN — Medication 1 DROP(S): at 05:45

## 2024-10-31 RX ADMIN — Medication 40 MILLIGRAM(S): at 18:35

## 2024-10-31 RX ADMIN — Medication 1 DROP(S): at 14:07

## 2024-10-31 RX ADMIN — Medication 1 LOZENGE: at 13:55

## 2024-10-31 RX ADMIN — FOLIC ACID 1 MILLIGRAM(S): 1 TABLET ORAL at 13:53

## 2024-10-31 RX ADMIN — HYDRALAZINE HYDROCHLORIDE 100 MILLIGRAM(S): 50 TABLET, FILM COATED ORAL at 05:44

## 2024-10-31 RX ADMIN — Medication 1 LOZENGE: at 22:31

## 2024-10-31 RX ADMIN — QUETIAPINE FUMARATE 200 MILLIGRAM(S): 200 TABLET ORAL at 05:45

## 2024-10-31 RX ADMIN — Medication 3 MILLIGRAM(S): at 22:25

## 2024-10-31 RX ADMIN — Medication 2 TABLET(S): at 22:22

## 2024-10-31 RX ADMIN — Medication 2 MILLIGRAM(S): at 18:35

## 2024-10-31 RX ADMIN — Medication 325 MILLIGRAM(S): at 13:54

## 2024-10-31 RX ADMIN — Medication 1 LOZENGE: at 05:45

## 2024-10-31 RX ADMIN — Medication 0.4 MILLIGRAM(S): at 22:22

## 2024-10-31 RX ADMIN — Medication 975 MILLIGRAM(S): at 06:44

## 2024-10-31 RX ADMIN — HYDRALAZINE HYDROCHLORIDE 100 MILLIGRAM(S): 50 TABLET, FILM COATED ORAL at 22:22

## 2024-10-31 RX ADMIN — Medication 300 MILLIGRAM(S): at 05:44

## 2024-10-31 RX ADMIN — Medication 81 MILLIGRAM(S): at 13:54

## 2024-10-31 RX ADMIN — Medication 20 MILLIGRAM(S): at 18:34

## 2024-10-31 RX ADMIN — Medication 10 MILLIGRAM(S): at 22:22

## 2024-10-31 RX ADMIN — Medication 1 DROP(S): at 22:30

## 2024-11-01 LAB
ANION GAP SERPL CALC-SCNC: 8 MMOL/L — SIGNIFICANT CHANGE UP (ref 5–17)
BUN SERPL-MCNC: 11 MG/DL — SIGNIFICANT CHANGE UP (ref 7–23)
CALCIUM SERPL-MCNC: 9 MG/DL — SIGNIFICANT CHANGE UP (ref 8.5–10.1)
CHLORIDE SERPL-SCNC: 99 MMOL/L — SIGNIFICANT CHANGE UP (ref 96–108)
CO2 SERPL-SCNC: 26 MMOL/L — SIGNIFICANT CHANGE UP (ref 22–31)
CREAT SERPL-MCNC: 0.54 MG/DL — SIGNIFICANT CHANGE UP (ref 0.5–1.3)
EGFR: 111 ML/MIN/1.73M2 — SIGNIFICANT CHANGE UP
GLUCOSE SERPL-MCNC: 98 MG/DL — SIGNIFICANT CHANGE UP (ref 70–99)
HCT VFR BLD CALC: 27.7 % — LOW (ref 39–50)
HGB BLD-MCNC: 9.3 G/DL — LOW (ref 13–17)
MCHC RBC-ENTMCNC: 25.8 PG — LOW (ref 27–34)
MCHC RBC-ENTMCNC: 33.6 G/DL — SIGNIFICANT CHANGE UP (ref 32–36)
MCV RBC AUTO: 76.9 FL — LOW (ref 80–100)
NRBC # BLD: 0 /100 WBCS — SIGNIFICANT CHANGE UP (ref 0–0)
PLATELET # BLD AUTO: 427 K/UL — HIGH (ref 150–400)
POTASSIUM SERPL-MCNC: 3.6 MMOL/L — SIGNIFICANT CHANGE UP (ref 3.5–5.3)
POTASSIUM SERPL-SCNC: 3.6 MMOL/L — SIGNIFICANT CHANGE UP (ref 3.5–5.3)
RBC # BLD: 3.6 M/UL — LOW (ref 4.2–5.8)
RBC # FLD: 16.7 % — HIGH (ref 10.3–14.5)
SODIUM SERPL-SCNC: 133 MMOL/L — LOW (ref 135–145)
WBC # BLD: 8.38 K/UL — SIGNIFICANT CHANGE UP (ref 3.8–10.5)
WBC # FLD AUTO: 8.38 K/UL — SIGNIFICANT CHANGE UP (ref 3.8–10.5)

## 2024-11-01 PROCEDURE — 99232 SBSQ HOSP IP/OBS MODERATE 35: CPT

## 2024-11-01 RX ADMIN — Medication 2 MILLIGRAM(S): at 18:21

## 2024-11-01 RX ADMIN — QUETIAPINE FUMARATE 200 MILLIGRAM(S): 200 TABLET ORAL at 18:21

## 2024-11-01 RX ADMIN — Medication 325 MILLIGRAM(S): at 13:17

## 2024-11-01 RX ADMIN — Medication 60 MILLIGRAM(S): at 05:23

## 2024-11-01 RX ADMIN — HYDRALAZINE HYDROCHLORIDE 100 MILLIGRAM(S): 50 TABLET, FILM COATED ORAL at 05:23

## 2024-11-01 RX ADMIN — Medication 40 MILLIGRAM(S): at 18:20

## 2024-11-01 RX ADMIN — Medication 2 MILLIGRAM(S): at 05:23

## 2024-11-01 RX ADMIN — Medication 81 MILLIGRAM(S): at 13:16

## 2024-11-01 RX ADMIN — HYDRALAZINE HYDROCHLORIDE 100 MILLIGRAM(S): 50 TABLET, FILM COATED ORAL at 22:50

## 2024-11-01 RX ADMIN — Medication 975 MILLIGRAM(S): at 23:50

## 2024-11-01 RX ADMIN — Medication 1 DROP(S): at 05:50

## 2024-11-01 RX ADMIN — Medication 1 LOZENGE: at 13:19

## 2024-11-01 RX ADMIN — Medication 20 MILLIGRAM(S): at 18:21

## 2024-11-01 RX ADMIN — Medication 1 DROP(S): at 22:50

## 2024-11-01 RX ADMIN — Medication 3 MILLIGRAM(S): at 22:50

## 2024-11-01 RX ADMIN — Medication 1 LOZENGE: at 22:51

## 2024-11-01 RX ADMIN — FOLIC ACID 1 MILLIGRAM(S): 1 TABLET ORAL at 13:16

## 2024-11-01 RX ADMIN — Medication 10 MILLIGRAM(S): at 22:51

## 2024-11-01 RX ADMIN — Medication 1 LOZENGE: at 05:24

## 2024-11-01 RX ADMIN — Medication 300 MILLIGRAM(S): at 18:21

## 2024-11-01 RX ADMIN — HYDRALAZINE HYDROCHLORIDE 100 MILLIGRAM(S): 50 TABLET, FILM COATED ORAL at 13:16

## 2024-11-01 RX ADMIN — Medication 975 MILLIGRAM(S): at 22:50

## 2024-11-01 RX ADMIN — Medication 20 MILLIGRAM(S): at 05:23

## 2024-11-01 RX ADMIN — DIVALPROEX SODIUM 1000 MILLIGRAM(S): 250 TABLET, FILM COATED, EXTENDED RELEASE ORAL at 13:17

## 2024-11-01 RX ADMIN — Medication 300 MILLIGRAM(S): at 05:23

## 2024-11-01 RX ADMIN — Medication 1 DROP(S): at 22:56

## 2024-11-01 RX ADMIN — Medication 0.4 MILLIGRAM(S): at 22:50

## 2024-11-01 RX ADMIN — Medication 1 DROP(S): at 13:22

## 2024-11-01 RX ADMIN — FINASTERIDE 5 MILLIGRAM(S): 5 TABLET, FILM COATED ORAL at 13:16

## 2024-11-01 RX ADMIN — QUETIAPINE FUMARATE 200 MILLIGRAM(S): 200 TABLET ORAL at 05:23

## 2024-11-02 PROCEDURE — 99232 SBSQ HOSP IP/OBS MODERATE 35: CPT

## 2024-11-02 RX ADMIN — QUETIAPINE FUMARATE 200 MILLIGRAM(S): 200 TABLET ORAL at 05:31

## 2024-11-02 RX ADMIN — Medication 975 MILLIGRAM(S): at 05:31

## 2024-11-02 RX ADMIN — FINASTERIDE 5 MILLIGRAM(S): 5 TABLET, FILM COATED ORAL at 11:19

## 2024-11-02 RX ADMIN — Medication 2 TABLET(S): at 21:38

## 2024-11-02 RX ADMIN — FOLIC ACID 1 MILLIGRAM(S): 1 TABLET ORAL at 11:19

## 2024-11-02 RX ADMIN — QUETIAPINE FUMARATE 200 MILLIGRAM(S): 200 TABLET ORAL at 17:18

## 2024-11-02 RX ADMIN — Medication 975 MILLIGRAM(S): at 14:30

## 2024-11-02 RX ADMIN — Medication 1 DROP(S): at 21:37

## 2024-11-02 RX ADMIN — Medication 975 MILLIGRAM(S): at 06:31

## 2024-11-02 RX ADMIN — Medication 3 MILLIGRAM(S): at 21:38

## 2024-11-02 RX ADMIN — Medication 0.4 MILLIGRAM(S): at 21:39

## 2024-11-02 RX ADMIN — HYDRALAZINE HYDROCHLORIDE 100 MILLIGRAM(S): 50 TABLET, FILM COATED ORAL at 05:31

## 2024-11-02 RX ADMIN — Medication 1 LOZENGE: at 21:39

## 2024-11-02 RX ADMIN — Medication 60 MILLIGRAM(S): at 05:32

## 2024-11-02 RX ADMIN — Medication 1 LOZENGE: at 05:31

## 2024-11-02 RX ADMIN — Medication 325 MILLIGRAM(S): at 11:19

## 2024-11-02 RX ADMIN — Medication 81 MILLIGRAM(S): at 11:21

## 2024-11-02 RX ADMIN — Medication 975 MILLIGRAM(S): at 21:38

## 2024-11-02 RX ADMIN — Medication 1 DROP(S): at 13:30

## 2024-11-02 RX ADMIN — Medication 2 MILLIGRAM(S): at 05:32

## 2024-11-02 RX ADMIN — Medication 300 MILLIGRAM(S): at 17:18

## 2024-11-02 RX ADMIN — Medication 40 MILLIGRAM(S): at 15:48

## 2024-11-02 RX ADMIN — Medication 975 MILLIGRAM(S): at 22:38

## 2024-11-02 RX ADMIN — Medication 975 MILLIGRAM(S): at 13:30

## 2024-11-02 RX ADMIN — Medication 2 MILLIGRAM(S): at 17:18

## 2024-11-02 RX ADMIN — Medication 2 TABLET(S): at 00:56

## 2024-11-02 RX ADMIN — Medication 1 DROP(S): at 05:32

## 2024-11-02 RX ADMIN — DIVALPROEX SODIUM 1000 MILLIGRAM(S): 250 TABLET, FILM COATED, EXTENDED RELEASE ORAL at 11:19

## 2024-11-02 RX ADMIN — Medication 1 LOZENGE: at 13:30

## 2024-11-02 RX ADMIN — Medication 20 MILLIGRAM(S): at 17:18

## 2024-11-02 RX ADMIN — HYDRALAZINE HYDROCHLORIDE 100 MILLIGRAM(S): 50 TABLET, FILM COATED ORAL at 13:50

## 2024-11-02 RX ADMIN — HYDRALAZINE HYDROCHLORIDE 100 MILLIGRAM(S): 50 TABLET, FILM COATED ORAL at 21:39

## 2024-11-02 RX ADMIN — Medication 10 MILLIGRAM(S): at 21:39

## 2024-11-02 RX ADMIN — Medication 20 MILLIGRAM(S): at 05:31

## 2024-11-02 RX ADMIN — Medication 300 MILLIGRAM(S): at 05:31

## 2024-11-03 PROCEDURE — 99232 SBSQ HOSP IP/OBS MODERATE 35: CPT

## 2024-11-03 RX ADMIN — Medication 1 LOZENGE: at 21:39

## 2024-11-03 RX ADMIN — Medication 975 MILLIGRAM(S): at 06:30

## 2024-11-03 RX ADMIN — Medication 1 DROP(S): at 21:40

## 2024-11-03 RX ADMIN — Medication 60 MILLIGRAM(S): at 05:30

## 2024-11-03 RX ADMIN — HYDRALAZINE HYDROCHLORIDE 100 MILLIGRAM(S): 50 TABLET, FILM COATED ORAL at 05:30

## 2024-11-03 RX ADMIN — Medication 40 MILLIGRAM(S): at 15:54

## 2024-11-03 RX ADMIN — Medication 81 MILLIGRAM(S): at 11:08

## 2024-11-03 RX ADMIN — Medication 1 LOZENGE: at 15:55

## 2024-11-03 RX ADMIN — Medication 300 MILLIGRAM(S): at 05:31

## 2024-11-03 RX ADMIN — Medication 325 MILLIGRAM(S): at 11:07

## 2024-11-03 RX ADMIN — Medication 1 LOZENGE: at 05:31

## 2024-11-03 RX ADMIN — Medication 975 MILLIGRAM(S): at 16:32

## 2024-11-03 RX ADMIN — Medication 20 MILLIGRAM(S): at 05:30

## 2024-11-03 RX ADMIN — HYDRALAZINE HYDROCHLORIDE 100 MILLIGRAM(S): 50 TABLET, FILM COATED ORAL at 21:39

## 2024-11-03 RX ADMIN — FOLIC ACID 1 MILLIGRAM(S): 1 TABLET ORAL at 11:07

## 2024-11-03 RX ADMIN — Medication 3 MILLIGRAM(S): at 21:39

## 2024-11-03 RX ADMIN — Medication 975 MILLIGRAM(S): at 15:59

## 2024-11-03 RX ADMIN — Medication 975 MILLIGRAM(S): at 21:39

## 2024-11-03 RX ADMIN — FINASTERIDE 5 MILLIGRAM(S): 5 TABLET, FILM COATED ORAL at 11:07

## 2024-11-03 RX ADMIN — Medication 20 MILLIGRAM(S): at 18:11

## 2024-11-03 RX ADMIN — Medication 2 MILLIGRAM(S): at 18:11

## 2024-11-03 RX ADMIN — Medication 975 MILLIGRAM(S): at 05:20

## 2024-11-03 RX ADMIN — Medication 0.4 MILLIGRAM(S): at 21:40

## 2024-11-03 RX ADMIN — Medication 1 DROP(S): at 15:54

## 2024-11-03 RX ADMIN — HYDRALAZINE HYDROCHLORIDE 100 MILLIGRAM(S): 50 TABLET, FILM COATED ORAL at 15:54

## 2024-11-03 RX ADMIN — Medication 1 DROP(S): at 05:31

## 2024-11-03 RX ADMIN — QUETIAPINE FUMARATE 200 MILLIGRAM(S): 200 TABLET ORAL at 18:11

## 2024-11-03 RX ADMIN — POLYETHYLENE GLYCOL 3350 17 GRAM(S): 17 POWDER, FOR SOLUTION ORAL at 11:08

## 2024-11-03 RX ADMIN — QUETIAPINE FUMARATE 200 MILLIGRAM(S): 200 TABLET ORAL at 05:31

## 2024-11-03 RX ADMIN — DIVALPROEX SODIUM 1000 MILLIGRAM(S): 250 TABLET, FILM COATED, EXTENDED RELEASE ORAL at 11:08

## 2024-11-03 RX ADMIN — Medication 2 MILLIGRAM(S): at 05:30

## 2024-11-03 RX ADMIN — Medication 10 MILLIGRAM(S): at 21:45

## 2024-11-03 RX ADMIN — Medication 975 MILLIGRAM(S): at 05:30

## 2024-11-03 RX ADMIN — Medication 300 MILLIGRAM(S): at 18:11

## 2024-11-03 NOTE — PROGRESS NOTE ADULT - ASSESSMENT
64 year old male with PMHx of HTN, HLD, alpha-thalassemia minor, BPH, and bipolar disorder who presented to the ED on 10/18/24 for complaints of fall and admitted for R. hip intertrochanteric fracture.        group home dc planning on monday     R. hip intertrochanteric fracture secondary to mechanical fall  POD 3 sp R Hip IMN  DVT ppx  Complex placement , no insurance        Moderate hyponatremia, clinically euvolemic  resolved      Chronic medical conditions:  HTN: c/w home meds   HLD: continue simvastatin 20 mg qhs  BPH: r continue tamsulosin 0.4 mg qhs, finasteride 5 mg  Bipolar disorder:  continue valproate  mg 2 tabs qhs, quetiapine 200 mg BID, benztropine 2 mg BID to prevent extrapyramidal symptoms  
64 year old male with PMHx of HTN, HLD, alpha-thalassemia minor, BPH, and bipolar disorder who presented to the ED on 10/18/24 for complaints of fall and admitted for R. hip intertrochanteric fracture.        patient currently being ambulated requires assistance case management is working on insurance for placement  inrehab is [facility]     R. hip intertrochanteric fracture secondary to mechanical fall  POD 3 sp R Hip IMN  DVT ppx  Complex placement , no insurance        Moderate hyponatremia, clinically euvolemic  resolved      Chronic medical conditions:  HTN: c/w home meds   HLD: continue simvastatin 20 mg qhs  BPH: r continue tamsulosin 0.4 mg qhs, finasteride 5 mg  Bipolar disorder:  continue valproate  mg 2 tabs qhs, quetiapine 200 mg BID, benztropine 2 mg BID to prevent extrapyramidal symptoms  
64 year old male with PMHx of HTN, HLD, alpha-thalassemia minor, BPH, and bipolar disorder who presented to the ED on 10/18/24 for complaints of fall and admitted for R. hip intertrochanteric fracture.    R. hip intertrochanteric fracture secondary to mechanical fall  POD 3 sp R Hip IMN  DVT ppx  Complex placement , no insurance        Moderate hyponatremia, clinically euvolemic  resolved  Hypokalemia replace       Chronic medical conditions:  HTN: c/w home meds   HLD: continue simvastatin 20 mg qhs  BPH: r continue tamsulosin 0.4 mg qhs, finasteride 5 mg  Bipolar disorder:  continue valproate  mg 2 tabs qhs, quetiapine 200 mg BID, benztropine 2 mg BID to prevent extrapyramidal symptoms  
64 year old male with PMHx of HTN, HLD, alpha-thalassemia minor, BPH, and bipolar disorder who presented to the ED on 10/18/24 for complaints of fall and admitted for R. hip intertrochanteric fracture.        patient currently being ambulated requires assistance case management is working on insurance for placement  inrehab is [facility]     R. hip intertrochanteric fracture secondary to mechanical fall  POD 3 sp R Hip IMN  DVT ppx  Complex placement , no insurance        Moderate hyponatremia, clinically euvolemic  resolved      Chronic medical conditions:  HTN: c/w home meds   HLD: continue simvastatin 20 mg qhs  BPH: r continue tamsulosin 0.4 mg qhs, finasteride 5 mg  Bipolar disorder:  continue valproate  mg 2 tabs qhs, quetiapine 200 mg BID, benztropine 2 mg BID to prevent extrapyramidal symptoms  
64 year old male with PMHx of HTN, HLD, alpha-thalassemia minor, BPH, and bipolar disorder who presented to the ED on 10/18/24 for complaints of fall and admitted for R. hip intertrochanteric fracture.    R. hip intertrochanteric fracture secondary to mechanical fall  POD 3 sp R Hip IMN  DVT ppx  Complex placement , no insurance        Moderate hyponatremia, clinically euvolemic  resolved  Hypokalemia replace       Chronic medical conditions:  HTN: c/w home meds   HLD: continue simvastatin 20 mg qhs  BPH: r continue tamsulosin 0.4 mg qhs, finasteride 5 mg  Bipolar disorder:  continue valproate  mg 2 tabs qhs, quetiapine 200 mg BID, benztropine 2 mg BID to prevent extrapyramidal symptoms  
S/P IM nail of right hip intertrochanteric fx.    Plan:  PT/WBAT RLE with a walker.  Pain managements  DVT prophylaxis  F/U labs  NV and compartments check RLE.  Incentive spirometry.  Ice Rt hip.  May discharge to rehab tomorrow and follow as outpatient if stable and cleared by PT and medicine and follow as outpatient.      
64 year old male with PMHx of HTN, HLD, alpha-thalassemia minor, BPH, and bipolar disorder who presented to the ED on 10/18/24 for complaints of fall and admitted for R. hip intertrochanteric fracture.        group home dc planning on monday     R. hip intertrochanteric fracture secondary to mechanical fall  POD 3 sp R Hip IMN  DVT ppx  Complex placement , no insurance        Moderate hyponatremia, clinically euvolemic  resolved      Chronic medical conditions:  HTN: c/w home meds   HLD: continue simvastatin 20 mg qhs  BPH: r continue tamsulosin 0.4 mg qhs, finasteride 5 mg  Bipolar disorder:  continue valproate  mg 2 tabs qhs, quetiapine 200 mg BID, benztropine 2 mg BID to prevent extrapyramidal symptoms  
64 year old male with PMHx of HTN, HLD, alpha-thalassemia minor, BPH, and bipolar disorder who presented to the ED on 10/18/24 for complaints of fall and admitted for R. hip intertrochanteric fracture.    R. hip intertrochanteric fracture secondary to mechanical fall  POD 3 sp R Hip IMN  DVT ppx  Complex placement , no insurance        Moderate hyponatremia, clinically euvolemic  resolved  Hypokalemia replace       Chronic medical conditions:  HTN: c/w home meds   HLD: continue simvastatin 20 mg qhs  BPH: r continue tamsulosin 0.4 mg qhs, finasteride 5 mg  Bipolar disorder:  continue valproate  mg 2 tabs qhs, quetiapine 200 mg BID, benztropine 2 mg BID to prevent extrapyramidal symptoms  

## 2024-11-03 NOTE — PROGRESS NOTE ADULT - SUBJECTIVE AND OBJECTIVE BOX
HPI:  Patient is a 64yMale ambulator with cane who presents to [facility] ED w/ a c/o of R hip pain. Pt states he slipped on water and fell on the way to the bathroom. Denies HT/LOC. States inability to walk immediately following the injury. Denies any numbness or tingling. Denies having any other pain elsewhere. Denies orthopedic history. No other orthopedic concerns at this time.    T(C): 36.3 (10-18-24 @ 12:39), Max: 36.3 (10-18-24 @ 12:39)  HR: 52 (10-18-24 @ 12:39) (52 - 52)  BP: 122/75 (10-18-24 @ 12:39) (122/75 - 122/75)  RR: 18 (10-18-24 @ 12:39) (18 - 18)  SpO2: 99% (10-18-24 @ 12:39) (99% - 99%)        RIGHT HIP PAIN    MEWS Score    HTN (hypertension)    HLD (hyperlipidemia)    Autism    H/O: glaucoma    History of BPH    Current every day smoker    Intertrochanteric fracture of right hip    H/O colonoscopy    RIGHT HIP PAIN    90+    SysAdmin_VisitLink        Gen: NAD, Resting comfortably    RLE:  Skin intact, no erythema or ecchymosis  Externally and shortened leg  TTP by hip, nowhere else along RLE  Motor: + EHL/FHL/TA/GSC  Sensory: +SILT: SPN/DPN/ERICKSON/SAPH/TIB  Compartments soft and compressible  No calf tenderness  + DP    Secondary Assessment:  NC/AT, NTTP of clavicles, NTTP of C-,T-,L-Spine,  UEs: NTTP of Shoulders, Elbows, Wrists, Hands; NT with AROM/PROM of Shoulders, Elbows, Wrists, Hands; AIN/PIN/Med/Uln/Msc/Rad/Ax intact  Patient is a 64y old  Male who presents with a chief complaint of INTERTROCHANTERIC FRACTURE OF RIGHT HIP     (21 Oct 2024 11:51)      INTERVAL HPI/OVERNIGHT EVENTS:    MEDICATIONS  (STANDING):  acetaminophen     Tablet .. 975 milliGRAM(s) Oral every 8 hours  artificial  tears Solution 1 Drop(s) Both EYES three times a day  aspirin  chewable 81 milliGRAM(s) Oral daily  atorvastatin 10 milliGRAM(s) Oral at bedtime  benzocaine/menthol Lozenge 1 Lozenge Oral three times a day  benztropine 2 milliGRAM(s) Oral two times a day  divalproex ER 1000 milliGRAM(s) Oral daily  enoxaparin Injectable 40 milliGRAM(s) SubCutaneous every 24 hours  ferrous    sulfate 325 milliGRAM(s) Oral daily  finasteride 5 milliGRAM(s) Oral daily  folic acid 1 milliGRAM(s) Oral daily  hydrALAZINE 100 milliGRAM(s) Oral three times a day  influenza  Vaccine (HIGH DOSE) 0.5 milliLiter(s) IntraMuscular once  labetalol 300 milliGRAM(s) Oral two times a day  latanoprost 0.005% Ophthalmic Solution 1 Drop(s) Both EYES at bedtime  lisinopril 20 milliGRAM(s) Oral two times a day  melatonin 3 milliGRAM(s) Oral at bedtime  NIFEdipine XL 60 milliGRAM(s) Oral daily  polyethylene glycol 3350 17 Gram(s) Oral daily  QUEtiapine 200 milliGRAM(s) Oral two times a day  senna 2 Tablet(s) Oral at bedtime  sodium chloride 0.9%. 1000 milliLiter(s) (125 mL/Hr) IV Continuous <Continuous>  tamsulosin 0.4 milliGRAM(s) Oral at bedtime    MEDICATIONS  (PRN):  oxyCODONE    IR 10 milliGRAM(s) Oral every 4 hours PRN Severe Pain (7 - 10)  oxyCODONE    IR 5 milliGRAM(s) Oral every 4 hours PRN Moderate Pain (4 - 6)  traMADol 50 milliGRAM(s) Oral every 6 hours PRN Mild Pain (1 - 3)      Allergies    adenosine (Unknown)    Intolerances        REVIEW OF SYSTEMS:  CONSTITUTIONAL: No fever, weight loss, or fatigue  EYES: No eye pain, visual disturbances, or discharge  ENMT:  No difficulty hearing, tinnitus, vertigo; No sinus or throat pain  NECK: No pain or stiffness  BREASTS: No pain, masses, or nipple discharge  RESPIRATORY: No cough, wheezing, chills or hemoptysis; No shortness of breath  CARDIOVASCULAR: No chest pain, palpitations, dizziness, or leg swelling  GASTROINTESTINAL: No abdominal or epigastric pain. No nausea, vomiting, or hematemesis; No diarrhea or constipation. No melena or hematochezia.  GENITOURINARY: No dysuria, frequency, hematuria, or incontinence  NEUROLOGICAL: No headaches, memory loss, loss of strength, numbness, or tremors  SKIN: No itching, burning, rashes, or lesions   LYMPH NODES: No enlarged glands  ENDOCRINE: No heat or cold intolerance; No hair loss  MUSCULOSKELETAL: No joint pain or swelling; No muscle, back, or extremity pain  PSYCHIATRIC: No depression, anxiety, mood swings, or difficulty sleeping  HEME/LYMPH: No easy bruising, or bleeding gums  ALLERGY AND IMMUNOLOGIC: No hives or eczema    Vital Signs Last 24 Hrs  T(C): 36.7 (23 Oct 2024 10:49), Max: 36.8 (22 Oct 2024 23:26)  T(F): 98.1 (23 Oct 2024 10:49), Max: 98.2 (22 Oct 2024 23:26)  HR: 70 (23 Oct 2024 14:15) (63 - 72)  BP: 133/78 (23 Oct 2024 14:15) (110/71 - 169/81)  BP(mean): --  RR: 17 (23 Oct 2024 10:49) (14 - 17)  SpO2: 99% (23 Oct 2024 10:49) (98% - 100%)    Parameters below as of 23 Oct 2024 10:49  Patient On (Oxygen Delivery Method): room air        PHYSICAL EXAM:  GENERAL: NAD, well-groomed, well-developed  HEAD:  Atraumatic, Normocephalic  EYES: EOMI, PERRLA, conjunctiva and sclera clear  ENMT: No tonsillar erythema, exudates, or enlargement; Moist mucous membranes, Good dentition, No lesions  NECK: Supple, No JVD, Normal thyroid  NERVOUS SYSTEM:  Alert & Oriented X3, Good concentration; Motor Strength 5/5 B/L upper and lower extremities; DTRs 2+ intact and symmetric  CHEST/LUNG: Clear to ascultation  bilaterally; No rales, rhonchi, wheezing, or rubs  HEART: Regular rate and rhythm; No murmurs, rubs, or gallops  ABDOMEN: Soft, Nontender, Nondistended; Bowel sounds present  EXTREMITIES:  2+ Peripheral Pulses, No clubbing, cyanosis, or edema  LYMPH: No lymphadenopathy noted  SKIN: No rashes or lesions    LABS:                        9.0    9.60  )-----------( 253      ( 23 Oct 2024 05:59 )             26.2     10-23    136  |  104  |  7   ----------------------------<  114[H]  3.6   |  24  |  0.56    Ca    8.4[L]      23 Oct 2024 05:59        Urinalysis Basic - ( 23 Oct 2024 05:59 )    Color: x / Appearance: x / SG: x / pH: x  Gluc: 114 mg/dL / Ketone: x  / Bili: x / Urobili: x   Blood: x / Protein: x / Nitrite: x   Leuk Esterase: x / RBC: x / WBC x   Sq Epi: x / Non Sq Epi: x / Bacteria: x      CAPILLARY BLOOD GLUCOSE          RADIOLOGY & ADDITIONAL TESTS:    Imaging Personally Reviewed:  [ ] YES  [ ] NO    Consultant(s) Notes Reviewed:  [ ] YES  [ ] NO    Care Discussed with Consultants/Other Providers [ ] YES  [ ] NOLLE: Able to SLR, NT with Log Roll, NT with Heel Strike, NTTP of Hip, Knee, Ankle, foot; NT with AROM/PROM of Hip, Knee, Ankle, foot; Q/H/Gsc/TA/EHL/FHL intact    Imaging:  XR R Hip/Femur: Intertrochanteric fx. No other acute obvious fxs or dislocations.    A/P: 64yMale with R IT fx  Admit to ortho for OR tomorrow  Plan for IMN tomorrow  Patient aware of risks/benefits/alternatives of stated procedure - written consent obtained and all questions answered  Per nursing director at his group home ([place] [phone]) he makes medical decisions by himself and can sign consent  NPO except medications starting MN  IVF while NPO  One dose of SQH then hold chemical DVT ppx for surgery starting MN - use SCDs instead  Preop CBC/BMP/coags/T&S/EKG/CXR  Please document necessary medical optimizations for surgery   Pain Control As Needed   Ice hip as tolerated  NWB, Strict Bed Rest  SCDs while in bed   Medical recommendations appreciated   D/w attending - will update as needed (18 Oct 2024 18:55)  
Patient is a 64y old  Male who presents with a chief complaint of INTERTROCHANTERIC FRACTURE OF RIGHT HIP     (21 Oct 2024 11:51)    INTERVAL HPI/OVERNIGHT EVENTS:    MEDICATIONS  (STANDING):  acetaminophen     Tablet .. 975 milliGRAM(s) Oral every 8 hours  artificial  tears Solution 1 Drop(s) Both EYES three times a day  aspirin  chewable 81 milliGRAM(s) Oral daily  atorvastatin 10 milliGRAM(s) Oral at bedtime  benzocaine/menthol Lozenge 1 Lozenge Oral three times a day  benztropine 2 milliGRAM(s) Oral two times a day  divalproex ER 1000 milliGRAM(s) Oral daily  enoxaparin Injectable 40 milliGRAM(s) SubCutaneous every 24 hours  ferrous    sulfate 325 milliGRAM(s) Oral daily  finasteride 5 milliGRAM(s) Oral daily  folic acid 1 milliGRAM(s) Oral daily  hydrALAZINE 100 milliGRAM(s) Oral three times a day  influenza  Vaccine (HIGH DOSE) 0.5 milliLiter(s) IntraMuscular once  labetalol 300 milliGRAM(s) Oral two times a day  latanoprost 0.005% Ophthalmic Solution 1 Drop(s) Both EYES at bedtime  lisinopril 20 milliGRAM(s) Oral two times a day  melatonin 3 milliGRAM(s) Oral at bedtime  NIFEdipine XL 60 milliGRAM(s) Oral daily  polyethylene glycol 3350 17 Gram(s) Oral daily  QUEtiapine 200 milliGRAM(s) Oral two times a day  senna 2 Tablet(s) Oral at bedtime  sodium chloride 0.9%. 1000 milliLiter(s) (125 mL/Hr) IV Continuous <Continuous>  tamsulosin 0.4 milliGRAM(s) Oral at bedtime    MEDICATIONS  (PRN):  oxyCODONE    IR 10 milliGRAM(s) Oral every 4 hours PRN Severe Pain (7 - 10)  oxyCODONE    IR 5 milliGRAM(s) Oral every 4 hours PRN Moderate Pain (4 - 6)  traMADol 50 milliGRAM(s) Oral every 6 hours PRN Mild Pain (1 - 3)    Allergies    adenosine (Unknown)    Intolerances      REVIEW OF SYSTEMS:  All other systems reviewed and are negative    Vital Signs Last 24 Hrs  T(C): 36.5 (22 Oct 2024 11:21), Max: 36.7 (22 Oct 2024 05:03)  T(F): 97.7 (22 Oct 2024 11:21), Max: 98.1 (22 Oct 2024 05:03)  HR: 68 (22 Oct 2024 13:30) (60 - 68)  BP: 114/69 (22 Oct 2024 13:30) (114/69 - 162/79)  BP(mean): --  RR: 18 (22 Oct 2024 11:21) (14 - 18)  SpO2: 99% (22 Oct 2024 11:21) (96% - 100%)      Daily     Daily Weight in k.3 (22 Oct 2024 05:03)  I&O's Summary    21 Oct 2024 07:01  -  22 Oct 2024 07:00  --------------------------------------------------------  IN: 1250 mL / OUT: 2500 mL / NET: -1250 mL    22 Oct 2024 07:01  -  22 Oct 2024 13:53  --------------------------------------------------------  IN: 400 mL / OUT: 400 mL / NET: 0 mL      CAPILLARY BLOOD GLUCOSE        PHYSICAL EXAM:  GENERAL: NAD,    HEAD:  Atraumatic, Normocephalic  EYES: EOMI, PERRLA, conjunctiva and sclera clear  ENMT: No tonsillar erythema, exudates, or enlargement; Moist mucous membranes, Good dentition, No lesions  NECK: Supple, No JVD, Normal thyroid  NERVOUS SYSTEM:  Alert & Oriented X3, Good concentration; Motor Strength 5/5 B/L upper and lower extremities; DTRs 2+ intact and symmetric  CHEST/LUNG: Clear to percussion bilaterally; No rales, rhonchi, wheezing, or rubs  HEART: Regular rate and rhythm; No murmurs, rubs, or gallops  ABDOMEN: Soft, Nontender, Nondistended; Bowel sounds present  EXTREMITIES:  2+ Peripheral Pulses, No clubbing, cyanosis, or edema  LYMPH: No lymphadenopathy noted  SKIN: No rashes or lesions    Labs                          9.2    9.99  )-----------( 228      ( 22 Oct 2024 06:45 )             25.9     10-22    135  |  103  |  5[L]  ----------------------------<  111[H]  3.2[L]   |  25  |  0.51    Ca    8.5      22 Oct 2024 06:45            Urinalysis Basic - ( 22 Oct 2024 06:45 )    Color: x / Appearance: x / SG: x / pH: x  Gluc: 111 mg/dL / Ketone: x  / Bili: x / Urobili: x   Blood: x / Protein: x / Nitrite: x   Leuk Esterase: x / RBC: x / WBC x   Sq Epi: x / Non Sq Epi: x / Bacteria: x                  DVT prophylaxis: > Lovenox 40mg SQ daily  > Heparin   > SCD's
Patient is a 64y old  Male who presents with a chief complaint of INTERTROCHANTERIC FRACTURE OF RIGHT HIP     (21 Oct 2024 11:51)    INTERVAL HPI/OVERNIGHT EVENTS:    MEDICATIONS  (STANDING):  acetaminophen     Tablet .. 975 milliGRAM(s) Oral every 8 hours  artificial  tears Solution 1 Drop(s) Both EYES three times a day  aspirin  chewable 81 milliGRAM(s) Oral daily  atorvastatin 10 milliGRAM(s) Oral at bedtime  benzocaine/menthol Lozenge 1 Lozenge Oral three times a day  benztropine 2 milliGRAM(s) Oral two times a day  divalproex ER 1000 milliGRAM(s) Oral daily  enoxaparin Injectable 40 milliGRAM(s) SubCutaneous every 24 hours  ferrous    sulfate 325 milliGRAM(s) Oral daily  finasteride 5 milliGRAM(s) Oral daily  folic acid 1 milliGRAM(s) Oral daily  hydrALAZINE 100 milliGRAM(s) Oral three times a day  influenza  Vaccine (HIGH DOSE) 0.5 milliLiter(s) IntraMuscular once  labetalol 300 milliGRAM(s) Oral two times a day  latanoprost 0.005% Ophthalmic Solution 1 Drop(s) Both EYES at bedtime  lisinopril 20 milliGRAM(s) Oral two times a day  melatonin 3 milliGRAM(s) Oral at bedtime  NIFEdipine XL 60 milliGRAM(s) Oral daily  polyethylene glycol 3350 17 Gram(s) Oral daily  QUEtiapine 200 milliGRAM(s) Oral two times a day  senna 2 Tablet(s) Oral at bedtime  tamsulosin 0.4 milliGRAM(s) Oral at bedtime    MEDICATIONS  (PRN):  guaiFENesin Oral Liquid (Sugar-Free) 200 milliGRAM(s) Oral every 6 hours PRN Cough    Allergies    adenosine (Unknown)    Intolerances      REVIEW OF SYSTEMS:  All other systems reviewed and are negative    Vital Signs Last 24 Hrs  T(C): 36.6 (31 Oct 2024 10:47), Max: 36.8 (30 Oct 2024 21:30)  T(F): 97.8 (31 Oct 2024 10:47), Max: 98.2 (30 Oct 2024 21:30)  HR: 61 (31 Oct 2024 10:47) (61 - 79)  BP: 98/69 (31 Oct 2024 10:47) (98/69 - 154/72)  BP(mean): --  RR: 18 (31 Oct 2024 10:47) (18 - 20)  SpO2: 100% (31 Oct 2024 10:47) (95% - 100%)    Parameters below as of 31 Oct 2024 10:47  Patient On (Oxygen Delivery Method): room air      Daily     Daily   I&O's Summary    30 Oct 2024 07:01  -  31 Oct 2024 07:00  --------------------------------------------------------  IN: 0 mL / OUT: 750 mL / NET: -750 mL      CAPILLARY BLOOD GLUCOSE        PHYSICAL EXAM:  GENERAL: NAD,    HEAD:  Atraumatic, Normocephalic  EYES: EOMI, PERRLA, conjunctiva and sclera clear  ENMT: No tonsillar erythema, exudates, or enlargement; Moist mucous membranes, Good dentition, No lesions  NECK: Supple, No JVD, Normal thyroid  NERVOUS SYSTEM:  Alert & Oriented X3, Good concentration; Motor Strength 5/5 B/L upper and lower extremities; DTRs 2+ intact and symmetric  CHEST/LUNG: Clear to percussion bilaterally; No rales, rhonchi, wheezing, or rubs  HEART: Regular rate and rhythm; No murmurs, rubs, or gallops  ABDOMEN: Soft, Nontender, Nondistended; Bowel sounds present  EXTREMITIES:  2+ Peripheral Pulses, No clubbing, cyanosis, or edema  LYMPH: No lymphadenopathy noted  SKIN: No rashes or lesions    Labs                          9.4    8.16  )-----------( 433      ( 31 Oct 2024 05:47 )             28.6     10-31    133[L]  |  98  |  12  ----------------------------<  100[H]  4.0   |  26  |  0.67    Ca    9.2      31 Oct 2024 05:47            Urinalysis Basic - ( 31 Oct 2024 05:47 )    Color: x / Appearance: x / SG: x / pH: x  Gluc: 100 mg/dL / Ketone: x  / Bili: x / Urobili: x   Blood: x / Protein: x / Nitrite: x   Leuk Esterase: x / RBC: x / WBC x   Sq Epi: x / Non Sq Epi: x / Bacteria: x                  DVT prophylaxis: > Lovenox 40mg SQ daily  > Heparin   > SCD's
Patient seen and examined at bedside.  No acute complaints at this time. Pain well controlled. Denies chest pain, shortness of breath, nausea or vomiting.     LABS:                        8.9    10.28 )-----------( 196      ( 19 Oct 2024 11:48 )             26.0     10-19    133[L]  |  103  |  9   ----------------------------<  130[H]  3.8   |  25  |  0.72    Ca    8.2[L]      19 Oct 2024 11:48    TPro  7.4  /  Alb  3.3  /  TBili  0.3  /  DBili  x   /  AST  31  /  ALT  28  /  AlkPhos  61  10-18    PT/INR - ( 19 Oct 2024 06:50 )   PT: 15.2 sec;   INR: 1.35 ratio         PTT - ( 19 Oct 2024 06:50 )  PTT:31.2 sec  Urinalysis Basic - ( 19 Oct 2024 11:48 )    Color: x / Appearance: x / SG: x / pH: x  Gluc: 130 mg/dL / Ketone: x  / Bili: x / Urobili: x   Blood: x / Protein: x / Nitrite: x   Leuk Esterase: x / RBC: x / WBC x   Sq Epi: x / Non Sq Epi: x / Bacteria: x        VITAL SIGNS:  T(C): 36.8 (10-20-24 @ 03:30), Max: 37.4 (10-19-24 @ 23:30)  HR: 60 (10-20-24 @ 03:30) (52 - 94)  BP: 135/83 (10-20-24 @ 03:30) (130/82 - 178/95)  RR: 18 (10-20-24 @ 03:30) (13 - 21)  SpO2: 100% (10-20-24 @ 03:30) (94% - 100%)    General: NAD, resting comfortably in bed  RLE  Dressing C/D/I  SCDs present bilaterally  Compartments soft and compressible  No calf tenderness bilaterally  Motor: +TA/EHL/FHL/GSC  Sensory: +SILT SPN/DPN/ERICKSON/SAPH/TIB  2+ DP    A/P:  64y M POD 1 sp R Hip IMN  -PT/OT: Rec AYLIN  -WBAT  -Pain Control  -HOLD DVT ppx; START POD 1   -Completed perioperative abx x 24 hours  -FU AM Labs  -Rest, ice as needed  -Incentive Spirometry  -Medical management appreciated    Discussed with Attending who is aware and agrees with above plan. 
Patient seen and examined in PACU.  Complaining of controlled right hip pain.    VS:  /74, puls 59, puls ox 98%    PE:  Dressing D/C/I  NVI B/L LE  NVI B/L SHAHIDA  FHL/EHL/TA/GS intact B/L SHAHIDA.    
Postop Check    Patient tolerated the procedure well. Patient seen and examined at bedside.  No acute complaints at this time. Pain well controlled. Denies chest pain, shortness of breath, nausea or vomiting.     LABS:                        8.9    10.28 )-----------( 196      ( 19 Oct 2024 11:48 )             26.0     10-19    133[L]  |  103  |  9   ----------------------------<  130[H]  3.8   |  25  |  0.72    Ca    8.2[L]      19 Oct 2024 11:48    TPro  7.4  /  Alb  3.3  /  TBili  0.3  /  DBili  x   /  AST  31  /  ALT  28  /  AlkPhos  61  10-18    PT/INR - ( 19 Oct 2024 06:50 )   PT: 15.2 sec;   INR: 1.35 ratio         PTT - ( 19 Oct 2024 06:50 )  PTT:31.2 sec      Physical Exam:  T(C): 36.7 (10-19-24 @ 14:30), Max: 37.3 (10-19-24 @ 05:04)  HR: 62 (10-19-24 @ 14:30) (52 - 94)  BP: 139/91 (10-19-24 @ 14:30) (128/81 - 171/96)  RR: 15 (10-19-24 @ 12:30) (13 - 21)  SpO2: 99% (10-19-24 @ 14:30) (94% - 100%)    General: NAD, resting comfortably in bed  RLE  Dressing C/D/I  SCDs present bilaterally  Compartments soft and compressible  No calf tenderness bilaterally  Motor: +TA/EHL/FHL/GSC  Sensory: +SILT SPN/DPN/ERICKSON/SAPH/TIB  2+ DP    A/P:  64y M POD 0 sp R Hip IMN  -PT/OT: FU PT Recs    -WBAT  -Pain Control  -HOLD DVT ppx; START POD 1   -Continue perioperative abx x 24 hours  -FU AM Labs  -Rest, ice as needed  -Incentive Spirometry  -Medical management appreciated    Discussed with Attending who is aware and agrees with above plan. 
Pt seen and examined at bedside. No acute events overnight. Pain controlled, denies any numbness or tingling. Denies nausea, vomiting, chest pain, or shortness of breath.         LABS:                        8.6    12.09 )-----------( 202      ( 20 Oct 2024 08:03 )             25.1     10-20    130[L]  |  98  |  10  ----------------------------<  113[H]  3.6   |  25  |  0.79    Ca    8.4[L]      20 Oct 2024 08:03        Urinalysis Basic - ( 20 Oct 2024 08:03 )    Color: x / Appearance: x / SG: x / pH: x  Gluc: 113 mg/dL / Ketone: x  / Bili: x / Urobili: x   Blood: x / Protein: x / Nitrite: x   Leuk Esterase: x / RBC: x / WBC x   Sq Epi: x / Non Sq Epi: x / Bacteria: x        VITAL SIGNS:  T(C): 36.8 (10-21-24 @ 05:13), Max: 36.9 (10-20-24 @ 16:10)  HR: 65 (10-21-24 @ 05:13) (64 - 76)  BP: 163/81 (10-21-24 @ 05:13) (112/69 - 170/92)  RR: 18 (10-21-24 @ 05:13) (16 - 19)  SpO2: 97% (10-21-24 @ 05:13) (97% - 100%)        EXAM:  Gen: NAD    Right Lower Extremity:  Dressing clean/dry/intact  Soft compartments  Negative calf ttp  +EHL/FHL/TA/GSc  SILT SPN, DPN, Tib, Saph, Frank  DP+           A/P:  64y M POD 2 sp R Hip IMN  -PT/OT: Rec AYLIN  -WBAT  -Pain Control  -DVT ppx: A81/Lovenox  -Completed perioperative abx x 24 hours  -FU AM Labs  -Rest, ice as needed  -Incentive Spirometry  -Medical management appreciated    Discussed with Attending who is aware and agrees with above plan. 
SUBJECTIVE:       Pt seen and examined at bedside. No acute events overnight. Patient doing well with no complaints overall. Reports pain well-controlled with medication. No CP, SOB, N/V, F/C, new N/T. Aware of OR plan.    Vital Signs (24 Hrs):  T(C): 37.3 (10-19-24 @ 05:04), Max: 37.3 (10-19-24 @ 05:04)  HR: 82 (10-19-24 @ 05:04) (52 - 82)  BP: 138/94 (10-19-24 @ 05:04) (122/75 - 171/96)  RR: 18 (10-19-24 @ 05:04) (17 - 18)  SpO2: 100% (10-19-24 @ 05:04) (97% - 100%)  Wt(kg): --    LABS:                          9.4    9.62  )-----------( 219      ( 19 Oct 2024 06:50 )             27.6     10-19    131[L]  |  98  |  9   ----------------------------<  112[H]  3.7   |  27  |  0.79    Ca    8.6      19 Oct 2024 06:50    TPro  7.4  /  Alb  3.3  /  TBili  0.3  /  DBili  x   /  AST  31  /  ALT  28  /  AlkPhos  61  10-18    LIVER FUNCTIONS - ( 18 Oct 2024 16:50 )  Alb: 3.3 g/dL / Pro: 7.4 gm/dL / ALK PHOS: 61 U/L / ALT: 28 U/L / AST: 31 U/L / GGT: x           PT/INR - ( 19 Oct 2024 06:50 )   PT: 15.2 sec;   INR: 1.35 ratio         PTT - ( 19 Oct 2024 06:50 )  PTT:31.2 sec    EXAM:  Gen: NAD, Resting comfortably    RLE:  Skin intact, no erythema or ecchymosis  Externally and shortened leg  TTP by hip, nowhere else along RLE  Motor: + EHL/FHL/TA/GSC  Sensory: +SILT: SPN/DPN/ERICKSON/SAPH/TIB  Compartments soft and compressible  No calf tenderness  + DP    A/P: 64yMale with R IT fx  Plan for IMN today  Patient aware of risks/benefits/alternatives of stated procedure - written consent obtained and all questions answered  Per nursing director at his group home ([place] [phone]) he makes medical decisions by himself and can sign consent  NPO except medications  IVF while NPO  Hold chemical DVT ppx for surgery - use SCDs instead  Please document necessary medical optimizations for surgery   Pain Control As Needed   Ice hip as tolerated  NWB, Strict Bed Rest  SCDs while in bed   Medical recommendations appreciated   D/w attending - will update as needed
HPI:  Patient is a 64yMale ambulator with cane who [facility] to [facility] ED w/ a c/o of R hip pain. Pt states he slipped on water and fell on the way to the bathroom. Denies HT/LOC. States inability to walk immediately following the injury. Denies any numbness or tingling. Denies having any other pain elsewhere. Denies orthopedic history. No other orthopedic concerns at this time.    T(C): 36.3 (10-18-24 @ 12:39), Max: 36.3 (10-18-24 @ 12:39)  HR: 52 (10-18-24 @ 12:39) (52 - 52)  BP: 122/75 (10-18-24 @ 12:39) (122/75 - 122/75)  RR: 18 (10-18-24 @ 12:39) (18 - 18)  SpO2: 99% (10-18-24 @ 12:39) (99% - 99%)        RIGHT HIP PAIN    MEWS Score    HTN (hypertension)    HLD (hyperlipidemia)    Autism    H/O: glaucoma    History of BPH    Current every day smoker    Intertrochanteric fracture of right hip    H/O colonoscopy    RIGHT HIP PAIN    90+    [email or web address]        Gen: NAD, Resting comfortably    RLE:  Skin intact, no erythema or ecchymosis  Externally and shortened leg  TTP by hip, nowhere else along RLE  Motor: + EHL/FHL/TA/GSC  Sensory: +SILT: SPN/DPN/ERICKSON/SAPH/TIB  Compartments soft and compressible  No calf tenderness  + DP    Secondary Assessment:  NC/AT, NTTP of clavicles, NTTP of C-,T-,L-Spine,  UEs: NTTP of Shoulders, Elbows, Wrists, Hands; NT with AROM/PROM of Shoulders, Elbows, Wrists, Hands; AIN/PIN/Med/Uln/Msc/Rad/Ax intact  Patient is a 64y old  Male who presents with a chief complaint of INTERTROCHANTERIC FRACTURE OF RIGHT HIP     (21 Oct 2024 11:51)      INTERVAL HPI/OVERNIGHT EVENTS:    MEDICATIONS  (STANDING):  acetaminophen     Tablet .. 975 milliGRAM(s) Oral every 8 hours  artificial  tears Solution 1 Drop(s) Both EYES three times a day  aspirin  chewable 81 milliGRAM(s) Oral daily  atorvastatin 10 milliGRAM(s) Oral at bedtime  benzocaine/menthol Lozenge 1 Lozenge Oral three times a day  benztropine 2 milliGRAM(s) Oral two times a day  divalproex ER 1000 milliGRAM(s) Oral daily  enoxaparin Injectable 40 milliGRAM(s) SubCutaneous every 24 hours  ferrous    sulfate 325 milliGRAM(s) Oral daily  finasteride 5 milliGRAM(s) Oral daily  folic acid 1 milliGRAM(s) Oral daily  hydrALAZINE 100 milliGRAM(s) Oral three times a day  influenza  Vaccine (HIGH DOSE) 0.5 milliLiter(s) IntraMuscular once  labetalol 300 milliGRAM(s) Oral two times a day  latanoprost 0.005% Ophthalmic Solution 1 Drop(s) Both EYES at bedtime  lisinopril 20 milliGRAM(s) Oral two times a day  melatonin 3 milliGRAM(s) Oral at bedtime  NIFEdipine XL 60 milliGRAM(s) Oral daily  polyethylene glycol 3350 17 Gram(s) Oral daily  QUEtiapine 200 milliGRAM(s) Oral two times a day  senna 2 Tablet(s) Oral at bedtime  sodium chloride 0.9%. 1000 milliLiter(s) (125 mL/Hr) IV Continuous <Continuous>  tamsulosin 0.4 milliGRAM(s) Oral at bedtime    MEDICATIONS  (PRN):  oxyCODONE    IR 10 milliGRAM(s) Oral every 4 hours PRN Severe Pain (7 - 10)  oxyCODONE    IR 5 milliGRAM(s) Oral every 4 hours PRN Moderate Pain (4 - 6)  traMADol 50 milliGRAM(s) Oral every 6 hours PRN Mild Pain (1 - 3)      Allergies    adenosine (Unknown)    Intolerances        REVIEW OF SYSTEMS:  CONSTITUTIONAL: No fever, weight loss, or fatigue  EYES: No eye pain, visual disturbances, or discharge  ENMT:  No difficulty hearing, tinnitus, vertigo; No sinus or throat pain  NECK: No pain or stiffness  BREASTS: No pain, masses, or nipple discharge  RESPIRATORY: No cough, wheezing, chills or hemoptysis; No shortness of breath  CARDIOVASCULAR: No chest pain, palpitations, dizziness, or leg swelling  GASTROINTESTINAL: No abdominal or epigastric pain. No nausea, vomiting, or hematemesis; No diarrhea or constipation. No melena or hematochezia.  GENITOURINARY: No dysuria, frequency, hematuria, or incontinence  NEUROLOGICAL: No headaches, memory loss, loss of strength, numbness, or tremors  SKIN: No itching, burning, rashes, or lesions   LYMPH NODES: No enlarged glands  ENDOCRINE: No heat or cold intolerance; No hair loss  MUSCULOSKELETAL: No joint pain or swelling; No muscle, back, or extremity pain  PSYCHIATRIC: No depression, anxiety, mood swings, or difficulty sleeping  HEME/LYMPH: No easy bruising, or bleeding gums  ALLERGY AND IMMUNOLOGIC: No hives or eczema    Vital Signs Last 24 Hrs  T(C): 36.7 (23 Oct 2024 10:49), Max: 36.8 (22 Oct 2024 23:26)  T(F): 98.1 (23 Oct 2024 10:49), Max: 98.2 (22 Oct 2024 23:26)  HR: 70 (23 Oct 2024 14:15) (63 - 72)  BP: 133/78 (23 Oct 2024 14:15) (110/71 - 169/81)  BP(mean): --  RR: 17 (23 Oct 2024 10:49) (14 - 17)  SpO2: 99% (23 Oct 2024 10:49) (98% - 100%)    Parameters below as of 23 Oct 2024 10:49  Patient On (Oxygen Delivery Method): room air        PHYSICAL EXAM:  GENERAL: NAD, well-groomed, well-developed  HEAD:  Atraumatic, Normocephalic  EYES: EOMI, PERRLA, conjunctiva and sclera clear  ENMT: No tonsillar erythema, exudates, or enlargement; Moist mucous membranes, Good dentition, No lesions  NECK: Supple, No JVD, Normal thyroid  NERVOUS SYSTEM:  Alert & Oriented X3, Good concentration; Motor Strength 5/5 B/L upper and lower extremities; DTRs 2+ intact and symmetric  CHEST/LUNG: Clear to ascultation  bilaterally; No rales, rhonchi, wheezing, or rubs  HEART: Regular rate and rhythm; No murmurs, rubs, or gallops  ABDOMEN: Soft, Nontender, Nondistended; Bowel sounds present  EXTREMITIES:  2+ Peripheral Pulses, No clubbing, cyanosis, or edema  LYMPH: No lymphadenopathy noted  SKIN: No rashes or lesions    LABS:                        9.0    9.60  )-----------( 253      ( 23 Oct 2024 05:59 )             26.2     10-23    136  |  104  |  7   ----------------------------<  114[H]  3.6   |  24  |  0.56    Ca    8.4[L]      23 Oct 2024 05:59        Urinalysis Basic - ( 23 Oct 2024 05:59 )    Color: x / Appearance: x / SG: x / pH: x  Gluc: 114 mg/dL / Ketone: x  / Bili: x / Urobili: x   Blood: x / Protein: x / Nitrite: x   Leuk Esterase: x / RBC: x / WBC x   Sq Epi: x / Non Sq Epi: x / Bacteria: x      CAPILLARY BLOOD GLUCOSE          RADIOLOGY & ADDITIONAL TESTS:    Imaging Personally Reviewed:  [ ] YES  [ ] NO    Consultant(s) Notes Reviewed:  [ ] YES  [ ] NO    Care Discussed with Consultants/Other Providers [ ] YES  [ ] NOLLE: Able to SLR, NT with Log Roll, NT with Heel Strike, NTTP of Hip, Knee, Ankle, foot; NT with AROM/PROM of Hip, Knee, Ankle, foot; Q/H/Gsc/TA/EHL/FHL intact    Imaging:  XR R Hip/Femur: Intertrochanteric fx. No other acute obvious fxs or dislocations.    A/P: 64yMale with R IT fx  Admit to ortho for OR tomorrow  Plan for IMN tomorrow  Patient aware of risks/benefits/alternatives of stated procedure - written consent obtained and all questions answered  Per nursing director at his group home ([place] [phone]) he makes medical decisions by himself and can sign consent  NPO except medications starting MN  IVF while NPO  One dose of SQH then hold chemical DVT ppx for surgery starting MN - use SCDs instead  Preop CBC/BMP/coags/T&S/EKG/CXR  Please document necessary medical optimizations for surgery   Pain Control As Needed   Ice hip as tolerated  NWB, Strict Bed Rest  SCDs while in bed   Medical recommendations appreciated   D/w attending - will update as needed (18 Oct 2024 18:55)  
HPI:  Patient is a 64yMale ambulator with cane who presents to [facility] ED w/ a c/o of R hip pain. Pt states he slipped on water and fell on the way to the bathroom. Denies HT/LOC. States inability to walk immediately following the injury. Denies any numbness or tingling. Denies having any other pain elsewhere. Denies orthopedic history. No other orthopedic concerns at this time.    T(C): 36.3 (10-18-24 @ 12:39), Max: 36.3 (10-18-24 @ 12:39)  HR: 52 (10-18-24 @ 12:39) (52 - 52)  BP: 122/75 (10-18-24 @ 12:39) (122/75 - 122/75)  RR: 18 (10-18-24 @ 12:39) (18 - 18)  SpO2: 99% (10-18-24 @ 12:39) (99% - 99%)        RIGHT HIP PAIN    MEWS Score    HTN (hypertension)    HLD (hyperlipidemia)    Autism    H/O: glaucoma    History of BPH    Current every day smoker    Intertrochanteric fracture of right hip    H/O colonoscopy    RIGHT HIP PAIN    90+    [email or web address]        Gen: NAD, Resting comfortably    RLE:  Skin intact, no erythema or ecchymosis  Externally and shortened leg  TTP by hip, nowhere else along RLE  Motor: + EHL/FHL/TA/GSC  Sensory: +SILT: SPN/DPN/ERICKSON/SAPH/TIB  Compartments soft and compressible  No calf tenderness  + DP    Secondary Assessment:  NC/AT, NTTP of clavicles, NTTP of C-,T-,L-Spine,  UEs: NTTP of Shoulders, Elbows, Wrists, Hands; NT with AROM/PROM of Shoulders, Elbows, Wrists, Hands; AIN/PIN/Med/Uln/Msc/Rad/Ax intact  Patient is a 64y old  Male who presents with a chief complaint of INTERTROCHANTERIC FRACTURE OF RIGHT HIP     (21 Oct 2024 11:51)      INTERVAL HPI/OVERNIGHT EVENTS:    MEDICATIONS  (STANDING):  acetaminophen     Tablet .. 975 milliGRAM(s) Oral every 8 hours  artificial  tears Solution 1 Drop(s) Both EYES three times a day  aspirin  chewable 81 milliGRAM(s) Oral daily  atorvastatin 10 milliGRAM(s) Oral at bedtime  benzocaine/menthol Lozenge 1 Lozenge Oral three times a day  benztropine 2 milliGRAM(s) Oral two times a day  divalproex ER 1000 milliGRAM(s) Oral daily  enoxaparin Injectable 40 milliGRAM(s) SubCutaneous every 24 hours  ferrous    sulfate 325 milliGRAM(s) Oral daily  finasteride 5 milliGRAM(s) Oral daily  folic acid 1 milliGRAM(s) Oral daily  hydrALAZINE 100 milliGRAM(s) Oral three times a day  influenza  Vaccine (HIGH DOSE) 0.5 milliLiter(s) IntraMuscular once  labetalol 300 milliGRAM(s) Oral two times a day  latanoprost 0.005% Ophthalmic Solution 1 Drop(s) Both EYES at bedtime  lisinopril 20 milliGRAM(s) Oral two times a day  melatonin 3 milliGRAM(s) Oral at bedtime  NIFEdipine XL 60 milliGRAM(s) Oral daily  polyethylene glycol 3350 17 Gram(s) Oral daily  QUEtiapine 200 milliGRAM(s) Oral two times a day  senna 2 Tablet(s) Oral at bedtime  sodium chloride 0.9%. 1000 milliLiter(s) (125 mL/Hr) IV Continuous <Continuous>  tamsulosin 0.4 milliGRAM(s) Oral at bedtime    MEDICATIONS  (PRN):  oxyCODONE    IR 10 milliGRAM(s) Oral every 4 hours PRN Severe Pain (7 - 10)  oxyCODONE    IR 5 milliGRAM(s) Oral every 4 hours PRN Moderate Pain (4 - 6)  traMADol 50 milliGRAM(s) Oral every 6 hours PRN Mild Pain (1 - 3)      Allergies    adenosine (Unknown)    Intolerances        REVIEW OF SYSTEMS:  CONSTITUTIONAL: No fever, weight loss, or fatigue  EYES: No eye pain, visual disturbances, or discharge  ENMT:  No difficulty hearing, tinnitus, vertigo; No sinus or throat pain  NECK: No pain or stiffness  BREASTS: No pain, masses, or nipple discharge  RESPIRATORY: No cough, wheezing, chills or hemoptysis; No shortness of breath  CARDIOVASCULAR: No chest pain, palpitations, dizziness, or leg swelling  GASTROINTESTINAL: No abdominal or epigastric pain. No nausea, vomiting, or hematemesis; No diarrhea or constipation. No melena or hematochezia.  GENITOURINARY: No dysuria, frequency, hematuria, or incontinence  NEUROLOGICAL: No headaches, memory loss, loss of strength, numbness, or tremors  SKIN: No itching, burning, rashes, or lesions   LYMPH NODES: No enlarged glands  ENDOCRINE: No heat or cold intolerance; No hair loss  MUSCULOSKELETAL: No joint pain or swelling; No muscle, back, or extremity pain  PSYCHIATRIC: No depression, anxiety, mood swings, or difficulty sleeping  HEME/LYMPH: No easy bruising, or bleeding gums  ALLERGY AND IMMUNOLOGIC: No hives or eczema    Vital Signs Last 24 Hrs  T(C): 36.7 (23 Oct 2024 10:49), Max: 36.8 (22 Oct 2024 23:26)  T(F): 98.1 (23 Oct 2024 10:49), Max: 98.2 (22 Oct 2024 23:26)  HR: 70 (23 Oct 2024 14:15) (63 - 72)  BP: 133/78 (23 Oct 2024 14:15) (110/71 - 169/81)  BP(mean): --  RR: 17 (23 Oct 2024 10:49) (14 - 17)  SpO2: 99% (23 Oct 2024 10:49) (98% - 100%)    Parameters below as of 23 Oct 2024 10:49  Patient On (Oxygen Delivery Method): room air        PHYSICAL EXAM:  GENERAL: NAD, well-groomed, well-developed  HEAD:  Atraumatic, Normocephalic  EYES: EOMI, PERRLA, conjunctiva and sclera clear  ENMT: No tonsillar erythema, exudates, or enlargement; Moist mucous membranes, Good dentition, No lesions  NECK: Supple, No JVD, Normal thyroid  NERVOUS SYSTEM:  Alert & Oriented X3, Good concentration; Motor Strength 5/5 B/L upper and lower extremities; DTRs 2+ intact and symmetric  CHEST/LUNG: Clear to ascultation  bilaterally; No rales, rhonchi, wheezing, or rubs  HEART: Regular rate and rhythm; No murmurs, rubs, or gallops  ABDOMEN: Soft, Nontender, Nondistended; Bowel sounds present  EXTREMITIES:  2+ Peripheral Pulses, No clubbing, cyanosis, or edema  LYMPH: No lymphadenopathy noted  SKIN: No rashes or lesions    LABS:                        9.0    9.60  )-----------( 253      ( 23 Oct 2024 05:59 )             26.2     10-23    136  |  104  |  7   ----------------------------<  114[H]  3.6   |  24  |  0.56    Ca    8.4[L]      23 Oct 2024 05:59        Urinalysis Basic - ( 23 Oct 2024 05:59 )    Color: x / Appearance: x / SG: x / pH: x  Gluc: 114 mg/dL / Ketone: x  / Bili: x / Urobili: x   Blood: x / Protein: x / Nitrite: x   Leuk Esterase: x / RBC: x / WBC x   Sq Epi: x / Non Sq Epi: x / Bacteria: x      CAPILLARY BLOOD GLUCOSE          RADIOLOGY & ADDITIONAL TESTS:    Imaging Personally Reviewed:  [ ] YES  [ ] NO    Consultant(s) Notes Reviewed:  [ ] YES  [ ] NO    Care Discussed with Consultants/Other Providers [ ] YES  [ ] NOLLE: Able to SLR, NT with Log Roll, NT with Heel Strike, NTTP of Hip, Knee, Ankle, foot; NT with AROM/PROM of Hip, Knee, Ankle, foot; Q/H/Gsc/TA/EHL/FHL intact    Imaging:  XR R Hip/Femur: Intertrochanteric fx. No other acute obvious fxs or dislocations.    A/P: 64yMale with R IT fx  Admit to ortho for OR tomorrow  Plan for IMN tomorrow  Patient aware of risks/benefits/alternatives of stated procedure - written consent obtained and all questions answered  Per nursing director at his group home ([place] [phone]) he makes medical decisions by himself and can sign consent  NPO except medications starting MN  IVF while NPO  One dose of SQH then hold chemical DVT ppx for surgery starting MN - use SCDs instead  Preop CBC/BMP/coags/T&S/EKG/CXR  Please document necessary medical optimizations for surgery   Pain Control As Needed   Ice hip as tolerated  NWB, Strict Bed Rest  SCDs while in bed   Medical recommendations appreciated   D/w attending - will update as needed (18 Oct 2024 18:55)  
HPI:  Patient is a 64yMale ambulator with cane who presents to [facility] ED w/ a c/o of R hip pain. Pt states he slipped on water and fell on the way to the bathroom. Denies HT/LOC. States inability to walk immediately following the injury. Denies any numbness or tingling. Denies having any other pain elsewhere. Denies orthopedic history. No other orthopedic concerns at this time.    T(C): 36.3 (10-18-24 @ 12:39), Max: 36.3 (10-18-24 @ 12:39)  HR: 52 (10-18-24 @ 12:39) (52 - 52)  BP: 122/75 (10-18-24 @ 12:39) (122/75 - 122/75)  RR: 18 (10-18-24 @ 12:39) (18 - 18)  SpO2: 99% (10-18-24 @ 12:39) (99% - 99%)        RIGHT HIP PAIN    MEWS Score    HTN (hypertension)    HLD (hyperlipidemia)    Autism    H/O: glaucoma    History of BPH    Current every day smoker    Intertrochanteric fracture of right hip    H/O colonoscopy    RIGHT HIP PAIN    90+    [email or web address]        Gen: NAD, Resting comfortably    RLE:  Skin intact, no erythema or ecchymosis  Externally and shortened leg  TTP by hip, nowhere else along RLE  Motor: + EHL/FHL/TA/GSC  Sensory: +SILT: SPN/DPN/ERICKSON/SAPH/TIB  Compartments soft and compressible  No calf tenderness  + DP    Secondary Assessment:  NC/AT, NTTP of clavicles, NTTP of C-,T-,L-Spine,  UEs: NTTP of Shoulders, Elbows, Wrists, Hands; NT with AROM/PROM of Shoulders, Elbows, Wrists, Hands; AIN/PIN/Med/Uln/Msc/Rad/Ax intact  LLE: Able to SLR, NT with Log Roll, NT with Heel Strike, NTTP of Hip, Knee, Ankle, foot; NT with AROM/PROM of Hip, Knee, Ankle, foot; Q/H/Gsc/TA/EHL/FHL intact    Imaging:  XR R Hip/Femur: Intertrochanteric fx. No other acute obvious fxs or dislocations.    Patient is a 64y old  Male who presents with a chief complaint of INTERTROCHANTERIC FRACTURE OF RIGHT HIP     (21 Oct 2024 11:51)      INTERVAL HPI/OVERNIGHT EVENTS: wants to go home     MEDICATIONS  (STANDING):  acetaminophen     Tablet .. 975 milliGRAM(s) Oral every 8 hours  artificial  tears Solution 1 Drop(s) Both EYES three times a day  aspirin  chewable 81 milliGRAM(s) Oral daily  atorvastatin 10 milliGRAM(s) Oral at bedtime  benzocaine/menthol Lozenge 1 Lozenge Oral three times a day  benztropine 2 milliGRAM(s) Oral two times a day  divalproex ER 1000 milliGRAM(s) Oral daily  enoxaparin Injectable 40 milliGRAM(s) SubCutaneous every 24 hours  ferrous    sulfate 325 milliGRAM(s) Oral daily  finasteride 5 milliGRAM(s) Oral daily  folic acid 1 milliGRAM(s) Oral daily  hydrALAZINE 100 milliGRAM(s) Oral three times a day  influenza  Vaccine (HIGH DOSE) 0.5 milliLiter(s) IntraMuscular once  labetalol 300 milliGRAM(s) Oral two times a day  latanoprost 0.005% Ophthalmic Solution 1 Drop(s) Both EYES at bedtime  lisinopril 20 milliGRAM(s) Oral two times a day  melatonin 3 milliGRAM(s) Oral at bedtime  NIFEdipine XL 60 milliGRAM(s) Oral daily  polyethylene glycol 3350 17 Gram(s) Oral daily  QUEtiapine 200 milliGRAM(s) Oral two times a day  senna 2 Tablet(s) Oral at bedtime  tamsulosin 0.4 milliGRAM(s) Oral at bedtime    MEDICATIONS  (PRN):  guaiFENesin Oral Liquid (Sugar-Free) 200 milliGRAM(s) Oral every 6 hours PRN Cough      Allergies    adenosine (Unknown)    Intolerances        REVIEW OF SYSTEMS:  just wants to go home     Vital Signs Last 24 Hrs  T(C): 36.8 (30 Oct 2024 05:02), Max: 36.9 (29 Oct 2024 17:03)  T(F): 98.3 (30 Oct 2024 05:02), Max: 98.4 (29 Oct 2024 17:03)  HR: 69 (30 Oct 2024 05:02) (63 - 74)  BP: 145/82 (30 Oct 2024 05:02) (96/64 - 145/82)  RR: 18 (30 Oct 2024 05:02) (18 - 19)  SpO2: 97% (30 Oct 2024 05:02) (97% - 100%)    Parameters below as of 29 Oct 2024 14:50  Patient On (Oxygen Delivery Method): room air        PHYSICAL EXAM:  GENERAL: NAD,  NECK: Supple, No JVD, Normal thyroid  NERVOUS SYSTEM:  Alert & Oriented X3, Good concentration; Motor Strength 5/5 B/L upper and lower extremities; DTRs 2+ intact and symmetric  CHEST/LUNG: Clear to ascultation  bilaterally; No rales, rhonchi, wheezing, or rubs  HEART: Regular rate and rhythm; No murmurs, rubs, or gallops  ABDOMEN: Soft, Nontender, Nondistended; Bowel sounds present  EXTREMITIES:  2+ Peripheral Pulses, No clubbing, cyanosis, or edema  SKIN: No rashes or lesions    LABS:                        8.8    9.24  )-----------( 388      ( 30 Oct 2024 07:00 )             26.4     10-30    132[L]  |  99  |  15  ----------------------------<  92  3.8   |  24  |  0.59    Ca    8.7      30 Oct 2024 07:00        Urinalysis Basic - ( 30 Oct 2024 07:00 )    Color: x / Appearance: x / SG: x / pH: x  Gluc: 92 mg/dL / Ketone: x  / Bili: x / Urobili: x   Blood: x / Protein: x / Nitrite: x   Leuk Esterase: x / RBC: x / WBC x   Sq Epi: x / Non Sq Epi: x / Bacteria: x      CAPILLARY BLOOD GLUCOSE          RADIOLOGY & ADDITIONAL TESTS:    Imaging Personally Reviewed:  [ ] YES  [ ] NO    Consultant(s) Notes Reviewed:  [ ] YES  [ ] NO    Care Discussed with Consultants/Other Providers [ ] YES  [ ] KIRA/P: 64yMale with R IT fx  Admit to ortho for OR tomorrow  Plan for IMN tomorrow  Patient aware of risks/benefits/alternatives of stated procedure - written consent obtained and all questions answered  Per nursing director at his group home ([place] [phone]) he makes medical decisions by himself and can sign consent  NPO except medications starting MN  IVF while NPO  One dose of SQH then hold chemical DVT ppx for surgery starting MN - use SCDs instead  Preop CBC/BMP/coags/T&S/EKG/CXR  Please document necessary medical optimizations for surgery   Pain Control As Needed   Ice hip as tolerated  NWB, Strict Bed Rest  SCDs while in bed   Medical recommendations appreciated   D/w attending - will update as needed (18 Oct 2024 18:55)  
Pt seen and examined at bedside. No acute events overnight. Pain controlled, denies any numbness or tingling. Denies nausea, vomiting, chest pain, or shortness of breath.         LABS:                        9.7    11.64 )-----------( 194      ( 21 Oct 2024 08:20 )             28.1     10-21    133[L]  |  102  |  8   ----------------------------<  107[H]  3.3[L]   |  26  |  0.54    Ca    8.3[L]      21 Oct 2024 08:20        Urinalysis Basic - ( 21 Oct 2024 08:20 )    Color: x / Appearance: x / SG: x / pH: x  Gluc: 107 mg/dL / Ketone: x  / Bili: x / Urobili: x   Blood: x / Protein: x / Nitrite: x   Leuk Esterase: x / RBC: x / WBC x   Sq Epi: x / Non Sq Epi: x / Bacteria: x        VITAL SIGNS:  T(C): 36.7 (10-22-24 @ 05:03), Max: 36.7 (10-22-24 @ 05:03)  HR: 66 (10-22-24 @ 05:03) (60 - 79)  BP: 157/84 (10-22-24 @ 05:03) (122/68 - 161/87)  RR: 17 (10-22-24 @ 05:03) (14 - 18)  SpO2: 97% (10-22-24 @ 05:03) (96% - 100%)        EXAM:  Gen: NAD    Right Lower Extremity:  Dressing clean/dry/intact  Soft compartments  Negative calf ttp  +EHL/FHL/TA/GSc  SILT SPN, DPN, Tib, Saph, Frank  DP+           A/P:  64y M POD 3 sp R Hip IMN  -PT/OT: Rec [facility]  -WBAT  -Pain Control  -DVT ppx: A81/Lovenox  -FU AM Labs  -Rest, ice as needed  -Incentive Spirometry  -Medical management appreciated  -Dispo: pending [facility]LIN auth    Will discuss with Dr. Bentley and will update with any further recommendations 
HPI:  Patient is a 64yMale ambulator with cane who presents to [facility] ED w/ a c/o of R hip pain. Pt states he slipped on water and fell on the way to the bathroom. Denies HT/LOC. States inability to walk immediately following the injury. Denies any numbness or tingling. Denies having any other pain elsewhere. Denies orthopedic history. No other orthopedic concerns at this time.    T(C): 36.3 (10-18-24 @ 12:39), Max: 36.3 (10-18-24 @ 12:39)  HR: 52 (10-18-24 @ 12:39) (52 - 52)  BP: 122/75 (10-18-24 @ 12:39) (122/75 - 122/75)  RR: 18 (10-18-24 @ 12:39) (18 - 18)  SpO2: 99% (10-18-24 @ 12:39) (99% - 99%)        RIGHT HIP PAIN    MEWS Score    HTN (hypertension)    HLD (hyperlipidemia)    Autism    H/O: glaucoma    History of BPH    Current every day smoker    Intertrochanteric fracture of right hip    H/O colonoscopy    RIGHT HIP PAIN    90+    SysAdmin_VisitLink        Gen: NAD, Resting comfortably    RLE:  Skin intact, no erythema or ecchymosis  Externally and shortened leg  TTP by hip, nowhere else along RLE  Motor: + EHL/FHL/TA/GSC  Sensory: +SILT: SPN/DPN/ERICKSON/SAPH/TIB  Compartments soft and compressible  No calf tenderness  + DP    Secondary Assessment:  NC/AT, NTTP of clavicles, NTTP of C-,T-,L-Spine,  UEs: NTTP of Shoulders, Elbows, Wrists, Hands; NT with AROM/PROM of Shoulders, Elbows, Wrists, Hands; AIN/PIN/Med/Uln/Msc/Rad/Ax intact  Patient is a 64y old  Male who presents with a chief complaint of INTERTROCHANTERIC FRACTURE OF RIGHT HIP     (21 Oct 2024 11:51)      INTERVAL HPI/OVERNIGHT EVENTS:    MEDICATIONS  (STANDING):  acetaminophen     Tablet .. 975 milliGRAM(s) Oral every 8 hours  artificial  tears Solution 1 Drop(s) Both EYES three times a day  aspirin  chewable 81 milliGRAM(s) Oral daily  atorvastatin 10 milliGRAM(s) Oral at bedtime  benzocaine/menthol Lozenge 1 Lozenge Oral three times a day  benztropine 2 milliGRAM(s) Oral two times a day  divalproex ER 1000 milliGRAM(s) Oral daily  enoxaparin Injectable 40 milliGRAM(s) SubCutaneous every 24 hours  ferrous    sulfate 325 milliGRAM(s) Oral daily  finasteride 5 milliGRAM(s) Oral daily  folic acid 1 milliGRAM(s) Oral daily  hydrALAZINE 100 milliGRAM(s) Oral three times a day  influenza  Vaccine (HIGH DOSE) 0.5 milliLiter(s) IntraMuscular once  labetalol 300 milliGRAM(s) Oral two times a day  latanoprost 0.005% Ophthalmic Solution 1 Drop(s) Both EYES at bedtime  lisinopril 20 milliGRAM(s) Oral two times a day  melatonin 3 milliGRAM(s) Oral at bedtime  NIFEdipine XL 60 milliGRAM(s) Oral daily  polyethylene glycol 3350 17 Gram(s) Oral daily  QUEtiapine 200 milliGRAM(s) Oral two times a day  senna 2 Tablet(s) Oral at bedtime  sodium chloride 0.9%. 1000 milliLiter(s) (125 mL/Hr) IV Continuous <Continuous>  tamsulosin 0.4 milliGRAM(s) Oral at bedtime    MEDICATIONS  (PRN):  oxyCODONE    IR 10 milliGRAM(s) Oral every 4 hours PRN Severe Pain (7 - 10)  oxyCODONE    IR 5 milliGRAM(s) Oral every 4 hours PRN Moderate Pain (4 - 6)  traMADol 50 milliGRAM(s) Oral every 6 hours PRN Mild Pain (1 - 3)      Allergies    adenosine (Unknown)    Intolerances        REVIEW OF SYSTEMS:  CONSTITUTIONAL: No fever, weight loss, or fatigue  EYES: No eye pain, visual disturbances, or discharge  ENMT:  No difficulty hearing, tinnitus, vertigo; No sinus or throat pain  NECK: No pain or stiffness  BREASTS: No pain, masses, or nipple discharge  RESPIRATORY: No cough, wheezing, chills or hemoptysis; No shortness of breath  CARDIOVASCULAR: No chest pain, palpitations, dizziness, or leg swelling  GASTROINTESTINAL: No abdominal or epigastric pain. No nausea, vomiting, or hematemesis; No diarrhea or constipation. No melena or hematochezia.  GENITOURINARY: No dysuria, frequency, hematuria, or incontinence  NEUROLOGICAL: No headaches, memory loss, loss of strength, numbness, or tremors  SKIN: No itching, burning, rashes, or lesions   LYMPH NODES: No enlarged glands  ENDOCRINE: No heat or cold intolerance; No hair loss  MUSCULOSKELETAL: No joint pain or swelling; No muscle, back, or extremity pain  PSYCHIATRIC: No depression, anxiety, mood swings, or difficulty sleeping  HEME/LYMPH: No easy bruising, or bleeding gums  ALLERGY AND IMMUNOLOGIC: No hives or eczema    Vital Signs Last 24 Hrs  T(C): 36.7 (23 Oct 2024 10:49), Max: 36.8 (22 Oct 2024 23:26)  T(F): 98.1 (23 Oct 2024 10:49), Max: 98.2 (22 Oct 2024 23:26)  HR: 70 (23 Oct 2024 14:15) (63 - 72)  BP: 133/78 (23 Oct 2024 14:15) (110/71 - 169/81)  BP(mean): --  RR: 17 (23 Oct 2024 10:49) (14 - 17)  SpO2: 99% (23 Oct 2024 10:49) (98% - 100%)    Parameters below as of 23 Oct 2024 10:49  Patient On (Oxygen Delivery Method): room air        PHYSICAL EXAM:  GENERAL: NAD, well-groomed, well-developed  HEAD:  Atraumatic, Normocephalic  EYES: EOMI, PERRLA, conjunctiva and sclera clear  ENMT: No tonsillar erythema, exudates, or enlargement; Moist mucous membranes, Good dentition, No lesions  NECK: Supple, No JVD, Normal thyroid  NERVOUS SYSTEM:  Alert & Oriented X3, Good concentration; Motor Strength 5/5 B/L upper and lower extremities; DTRs 2+ intact and symmetric  CHEST/LUNG: Clear to ascultation  bilaterally; No rales, rhonchi, wheezing, or rubs  HEART: Regular rate and rhythm; No murmurs, rubs, or gallops  ABDOMEN: Soft, Nontender, Nondistended; Bowel sounds present  EXTREMITIES:  2+ Peripheral Pulses, No clubbing, cyanosis, or edema  LYMPH: No lymphadenopathy noted  SKIN: No rashes or lesions    LABS:                        9.0    9.60  )-----------( 253      ( 23 Oct 2024 05:59 )             26.2     10-23    136  |  104  |  7   ----------------------------<  114[H]  3.6   |  24  |  0.56    Ca    8.4[L]      23 Oct 2024 05:59        Urinalysis Basic - ( 23 Oct 2024 05:59 )    Color: x / Appearance: x / SG: x / pH: x  Gluc: 114 mg/dL / Ketone: x  / Bili: x / Urobili: x   Blood: x / Protein: x / Nitrite: x   Leuk Esterase: x / RBC: x / WBC x   Sq Epi: x / Non Sq Epi: x / Bacteria: x      CAPILLARY BLOOD GLUCOSE          RADIOLOGY & ADDITIONAL TESTS:    Imaging Personally Reviewed:  [ ] YES  [ ] NO    Consultant(s) Notes Reviewed:  [ ] YES  [ ] NO    Care Discussed with Consultants/Other Providers [ ] YES  [ ] NOLLE: Able to SLR, NT with Log Roll, NT with Heel Strike, NTTP of Hip, Knee, Ankle, foot; NT with AROM/PROM of Hip, Knee, Ankle, foot; Q/H/Gsc/TA/EHL/FHL intact    Imaging:  XR R Hip/Femur: Intertrochanteric fx. No other acute obvious fxs or dislocations.    A/P: 64yMale with R IT fx  Admit to ortho for OR tomorrow  Plan for IMN tomorrow  Patient aware of risks/benefits/alternatives of stated procedure - written consent obtained and all questions answered  Per nursing director at his group home ([place] [phone]) he makes medical decisions by himself and can sign consent  NPO except medications starting MN  IVF while NPO  One dose of SQH then hold chemical DVT ppx for surgery starting MN - use SCDs instead  Preop CBC/BMP/coags/T&S/EKG/CXR  Please document necessary medical optimizations for surgery   Pain Control As Needed   Ice hip as tolerated  NWB, Strict Bed Rest  SCDs while in bed   Medical recommendations appreciated   D/w attending - will update as needed (18 Oct 2024 18:55)  
Patient is a 64y old  Male who presents with a chief complaint of INTERTROCHANTERIC FRACTURE OF RIGHT HIP     (21 Oct 2024 11:51)    INTERVAL HPI/OVERNIGHT EVENTS:    MEDICATIONS  (STANDING):  acetaminophen     Tablet .. 975 milliGRAM(s) Oral every 8 hours  artificial  tears Solution 1 Drop(s) Both EYES three times a day  aspirin  chewable 81 milliGRAM(s) Oral daily  atorvastatin 10 milliGRAM(s) Oral at bedtime  benzocaine/menthol Lozenge 1 Lozenge Oral three times a day  benztropine 2 milliGRAM(s) Oral two times a day  divalproex ER 1000 milliGRAM(s) Oral daily  enoxaparin Injectable 40 milliGRAM(s) SubCutaneous every 24 hours  ferrous    sulfate 325 milliGRAM(s) Oral daily  finasteride 5 milliGRAM(s) Oral daily  folic acid 1 milliGRAM(s) Oral daily  hydrALAZINE 100 milliGRAM(s) Oral three times a day  influenza  Vaccine (HIGH DOSE) 0.5 milliLiter(s) IntraMuscular once  labetalol 300 milliGRAM(s) Oral two times a day  latanoprost 0.005% Ophthalmic Solution 1 Drop(s) Both EYES at bedtime  lisinopril 20 milliGRAM(s) Oral two times a day  melatonin 3 milliGRAM(s) Oral at bedtime  NIFEdipine XL 60 milliGRAM(s) Oral daily  polyethylene glycol 3350 17 Gram(s) Oral daily  QUEtiapine 200 milliGRAM(s) Oral two times a day  senna 2 Tablet(s) Oral at bedtime  tamsulosin 0.4 milliGRAM(s) Oral at bedtime    MEDICATIONS  (PRN):  guaiFENesin Oral Liquid (Sugar-Free) 200 milliGRAM(s) Oral every 6 hours PRN Cough    Allergies    adenosine (Unknown)    Intolerances      REVIEW OF SYSTEMS:  All other systems reviewed and are negative    Vital Signs Last 24 Hrs  T(C): 36.6 (03 Nov 2024 05:35), Max: 36.7 (02 Nov 2024 12:21)  T(F): 97.8 (03 Nov 2024 05:35), Max: 98.1 (02 Nov 2024 12:21)  HR: 65 (03 Nov 2024 06:39) (65 - 73)  BP: 147/78 (03 Nov 2024 06:39) (123/68 - 174/91)  BP(mean): --  RR: 18 (03 Nov 2024 05:35) (17 - 20)  SpO2: 99% (03 Nov 2024 05:35) (99% - 99%)    Parameters below as of 03 Nov 2024 05:35  Patient On (Oxygen Delivery Method): room air      Daily     Daily   I&O's Summary    02 Nov 2024 08:01  -  03 Nov 2024 07:00  --------------------------------------------------------  IN: 850 mL / OUT: 750 mL / NET: 100 mL      CAPILLARY BLOOD GLUCOSE        PHYSICAL EXAM:  GENERAL: NAD,    HEAD:  Atraumatic, Normocephalic  EYES: EOMI, PERRLA, conjunctiva and sclera clear  ENMT: No tonsillar erythema, exudates, or enlargement; Moist mucous membranes, Good dentition, No lesions  NECK: Supple, No JVD, Normal thyroid  NERVOUS SYSTEM:  Alert & Oriented X3, Good concentration; Motor Strength 5/5 B/L upper and lower extremities; DTRs 2+ intact and symmetric  CHEST/LUNG: Clear to percussion bilaterally; No rales, rhonchi, wheezing, or rubs  HEART: Regular rate and rhythm; No murmurs, rubs, or gallops  ABDOMEN: Soft, Nontender, Nondistended; Bowel sounds present  EXTREMITIES:  2+ Peripheral Pulses, No clubbing, cyanosis, or edema  LYMPH: No lymphadenopathy noted  SKIN: No rashes or lesions    Labs                                DVT prophylaxis: > Lovenox 40mg SQ daily  > Heparin   > SCD's
Patient is a 64y old  Male who presents with a chief complaint of INTERTROCHANTERIC FRACTURE OF RIGHT HIP     (21 Oct 2024 11:51)    INTERVAL HPI/OVERNIGHT EVENTS:    MEDICATIONS  (STANDING):  acetaminophen     Tablet .. 975 milliGRAM(s) Oral every 8 hours  artificial  tears Solution 1 Drop(s) Both EYES three times a day  aspirin  chewable 81 milliGRAM(s) Oral daily  atorvastatin 10 milliGRAM(s) Oral at bedtime  benzocaine/menthol Lozenge 1 Lozenge Oral three times a day  benztropine 2 milliGRAM(s) Oral two times a day  divalproex ER 1000 milliGRAM(s) Oral daily  enoxaparin Injectable 40 milliGRAM(s) SubCutaneous every 24 hours  ferrous    sulfate 325 milliGRAM(s) Oral daily  finasteride 5 milliGRAM(s) Oral daily  folic acid 1 milliGRAM(s) Oral daily  hydrALAZINE 100 milliGRAM(s) Oral three times a day  influenza  Vaccine (HIGH DOSE) 0.5 milliLiter(s) IntraMuscular once  labetalol 300 milliGRAM(s) Oral two times a day  latanoprost 0.005% Ophthalmic Solution 1 Drop(s) Both EYES at bedtime  lisinopril 20 milliGRAM(s) Oral two times a day  melatonin 3 milliGRAM(s) Oral at bedtime  NIFEdipine XL 60 milliGRAM(s) Oral daily  polyethylene glycol 3350 17 Gram(s) Oral daily  QUEtiapine 200 milliGRAM(s) Oral two times a day  senna 2 Tablet(s) Oral at bedtime  tamsulosin 0.4 milliGRAM(s) Oral at bedtime    MEDICATIONS  (PRN):  guaiFENesin Oral Liquid (Sugar-Free) 200 milliGRAM(s) Oral every 6 hours PRN Cough    Allergies    adenosine (Unknown)    Intolerances      REVIEW OF SYSTEMS:  All other systems reviewed and are negative    Vital Signs Last 24 Hrs  T(C): 36.7 (2024 04:41), Max: 36.9 (31 Oct 2024 23:31)  T(F): 98 (2024 04:41), Max: 98.5 (31 Oct 2024 23:31)  HR: 74 (2024 04:41) (56 - 74)  BP: 163/82 (2024 04:41) (118/76 - 163/82)  BP(mean): 109 (2024 04:41) (90 - 109)  RR: 17 (2024 04:41) (16 - 18)  SpO2: 98% (2024 04:41) (98% - 101%)    Parameters below as of 31 Oct 2024 17:19  Patient On (Oxygen Delivery Method): room air      Daily     Daily Weight in k.1 (2024 04:41)  I&O's Summary    31 Oct 2024 07:01  -  2024 07:00  --------------------------------------------------------  IN: 0 mL / OUT: 1000 mL / NET: -1000 mL      CAPILLARY BLOOD GLUCOSE        PHYSICAL EXAM:  GENERAL: NAD,    HEAD:  Atraumatic, Normocephalic  EYES: EOMI, PERRLA, conjunctiva and sclera clear  ENMT: No tonsillar erythema, exudates, or enlargement; Moist mucous membranes, Good dentition, No lesions  NECK: Supple, No JVD, Normal thyroid  NERVOUS SYSTEM:  Alert & Oriented X3, Good concentration; Motor Strength 5/5 B/L upper and lower extremities; DTRs 2+ intact and symmetric  CHEST/LUNG: Clear to percussion bilaterally; No rales, rhonchi, wheezing, or rubs  HEART: Regular rate and rhythm; No murmurs, rubs, or gallops  ABDOMEN: Soft, Nontender, Nondistended; Bowel sounds present  EXTREMITIES:  2+ Peripheral Pulses, No clubbing, cyanosis, or edema  LYMPH: No lymphadenopathy noted  SKIN: No rashes or lesions    Labs                          9.3    8.38  )-----------( 427      ( 2024 06:50 )             27.7     11-    133[L]  |  99  |  11  ----------------------------<  98  3.6   |  26  |  0.54    Ca    9.0      2024 06:50            Urinalysis Basic - ( 2024 06:50 )    Color: x / Appearance: x / SG: x / pH: x  Gluc: 98 mg/dL / Ketone: x  / Bili: x / Urobili: x   Blood: x / Protein: x / Nitrite: x   Leuk Esterase: x / RBC: x / WBC x   Sq Epi: x / Non Sq Epi: x / Bacteria: x                  DVT prophylaxis: > Lovenox 40mg SQ daily  > Heparin   > SCD's
Patient is a 64y old  Male who presents with a chief complaint of INTERTROCHANTERIC FRACTURE OF RIGHT HIP     (21 Oct 2024 11:51)    INTERVAL HPI/OVERNIGHT EVENTS: no events     MEDICATIONS  (STANDING):  acetaminophen     Tablet .. 975 milliGRAM(s) Oral every 8 hours  artificial  tears Solution 1 Drop(s) Both EYES three times a day  aspirin  chewable 81 milliGRAM(s) Oral daily  atorvastatin 10 milliGRAM(s) Oral at bedtime  benzocaine/menthol Lozenge 1 Lozenge Oral three times a day  benztropine 2 milliGRAM(s) Oral two times a day  divalproex ER 1000 milliGRAM(s) Oral daily  enoxaparin Injectable 40 milliGRAM(s) SubCutaneous every 24 hours  ferrous    sulfate 325 milliGRAM(s) Oral daily  finasteride 5 milliGRAM(s) Oral daily  folic acid 1 milliGRAM(s) Oral daily  hydrALAZINE 100 milliGRAM(s) Oral three times a day  influenza  Vaccine (HIGH DOSE) 0.5 milliLiter(s) IntraMuscular once  labetalol 300 milliGRAM(s) Oral two times a day  latanoprost 0.005% Ophthalmic Solution 1 Drop(s) Both EYES at bedtime  lisinopril 20 milliGRAM(s) Oral two times a day  melatonin 3 milliGRAM(s) Oral at bedtime  NIFEdipine XL 60 milliGRAM(s) Oral daily  polyethylene glycol 3350 17 Gram(s) Oral daily  QUEtiapine 200 milliGRAM(s) Oral two times a day  senna 2 Tablet(s) Oral at bedtime  tamsulosin 0.4 milliGRAM(s) Oral at bedtime    MEDICATIONS  (PRN):  guaiFENesin Oral Liquid (Sugar-Free) 200 milliGRAM(s) Oral every 6 hours PRN Cough    Allergies    adenosine (Unknown)    Intolerances      REVIEW OF SYSTEMS:  All other systems reviewed and are negative    Vital Signs Last 24 Hrs  T(C): 36.7 (30 Oct 2024 10:41), Max: 36.9 (29 Oct 2024 17:03)  T(F): 98.1 (30 Oct 2024 10:41), Max: 98.4 (29 Oct 2024 17:03)  HR: 78 (30 Oct 2024 10:41) (60 - 78)  BP: 130/77 (30 Oct 2024 10:41) (109/69 - 145/82)  BP(mean): --  RR: 18 (30 Oct 2024 10:00) (18 - 18)  SpO2: 97% (30 Oct 2024 10:41) (96% - 99%)    Parameters below as of 29 Oct 2024 14:50  Patient On (Oxygen Delivery Method): room air      Daily     Daily Weight in k.7 (30 Oct 2024 05:02)  I&O's Summary    29 Oct 2024 07:01  -  30 Oct 2024 07:00  --------------------------------------------------------  IN: 340 mL / OUT: 450 mL / NET: -110 mL      CAPILLARY BLOOD GLUCOSE        PHYSICAL EXAM:  GENERAL: NAD,    HEAD:  Atraumatic, Normocephalic  EYES: EOMI, PERRLA, conjunctiva and sclera clear  ENMT: No tonsillar erythema, exudates, or enlargement; Moist mucous membranes, Good dentition, No lesions  NECK: Supple, No JVD, Normal thyroid  NERVOUS SYSTEM:  Alert & Oriented X3, Good concentration; Motor Strength 5/5 B/L upper and lower extremities; DTRs 2+ intact and symmetric  CHEST/LUNG: Clear to percussion bilaterally; No rales, rhonchi, wheezing, or rubs  HEART: Regular rate and rhythm; No murmurs, rubs, or gallops  ABDOMEN: Soft, Nontender, Nondistended; Bowel sounds present  EXTREMITIES:  2+ Peripheral Pulses, No clubbing, cyanosis, or edema  LYMPH: No lymphadenopathy noted  SKIN: No rashes or lesions    Labs                          8.8    9.24  )-----------( 388      ( 30 Oct 2024 07:00 )             26.4     10-30    132[L]  |  99  |  15  ----------------------------<  92  3.8   |  24  |  0.59    Ca    8.7      30 Oct 2024 07:00            Urinalysis Basic - ( 30 Oct 2024 07:00 )    Color: x / Appearance: x / SG: x / pH: x  Gluc: 92 mg/dL / Ketone: x  / Bili: x / Urobili: x   Blood: x / Protein: x / Nitrite: x   Leuk Esterase: x / RBC: x / WBC x   Sq Epi: x / Non Sq Epi: x / Bacteria: x                  DVT prophylaxis: > Lovenox 40mg SQ daily  > Heparin   > SCD's
Patient is a 64y old  Male who presents with a chief complaint of INTERTROCHANTERIC FRACTURE OF RIGHT HIP     (21 Oct 2024 11:51)    INTERVAL HPI/OVERNIGHT EVENTS: no events     MEDICATIONS  (STANDING):  acetaminophen     Tablet .. 975 milliGRAM(s) Oral every 8 hours  artificial  tears Solution 1 Drop(s) Both EYES three times a day  aspirin  chewable 81 milliGRAM(s) Oral daily  atorvastatin 10 milliGRAM(s) Oral at bedtime  benzocaine/menthol Lozenge 1 Lozenge Oral three times a day  benztropine 2 milliGRAM(s) Oral two times a day  divalproex ER 1000 milliGRAM(s) Oral daily  enoxaparin Injectable 40 milliGRAM(s) SubCutaneous every 24 hours  ferrous    sulfate 325 milliGRAM(s) Oral daily  finasteride 5 milliGRAM(s) Oral daily  folic acid 1 milliGRAM(s) Oral daily  hydrALAZINE 100 milliGRAM(s) Oral three times a day  influenza  Vaccine (HIGH DOSE) 0.5 milliLiter(s) IntraMuscular once  labetalol 300 milliGRAM(s) Oral two times a day  latanoprost 0.005% Ophthalmic Solution 1 Drop(s) Both EYES at bedtime  lisinopril 20 milliGRAM(s) Oral two times a day  melatonin 3 milliGRAM(s) Oral at bedtime  NIFEdipine XL 60 milliGRAM(s) Oral daily  polyethylene glycol 3350 17 Gram(s) Oral daily  QUEtiapine 200 milliGRAM(s) Oral two times a day  senna 2 Tablet(s) Oral at bedtime  tamsulosin 0.4 milliGRAM(s) Oral at bedtime    MEDICATIONS  (PRN):  guaiFENesin Oral Liquid (Sugar-Free) 200 milliGRAM(s) Oral every 6 hours PRN Cough    Allergies    adenosine (Unknown)    Intolerances      REVIEW OF SYSTEMS:  All other systems reviewed and are negative    Vital Signs Last 24 Hrs  T(C): 36.8 (2024 06:23), Max: 36.9 (2024 23:16)  T(F): 98.3 (2024 06:23), Max: 98.4 (2024 23:16)  HR: 61 (2024 06:23) (61 - 73)  BP: 148/89 (2024 06:23) (120/70 - 158/87)  BP(mean): --  RR: 17 (2024 06:23) (17 - 18)  SpO2: 98% (2024 06:23) (98% - 99%)    Parameters below as of 2024 15:45  Patient On (Oxygen Delivery Method): room air      Daily     Daily Weight in k.7 (2024 06:23)  I&O's Summary    2024 07:01  -  2024 07:00  --------------------------------------------------------  IN: 350 mL / OUT: 1100 mL / NET: -750 mL      CAPILLARY BLOOD GLUCOSE        PHYSICAL EXAM:  GENERAL: NAD,    HEAD:  Atraumatic, Normocephalic  EYES: EOMI, PERRLA, conjunctiva and sclera clear  ENMT: No tonsillar erythema, exudates, or enlargement; Moist mucous membranes, Good dentition, No lesions  NECK: Supple, No JVD, Normal thyroid  NERVOUS SYSTEM:  Alert & Oriented X3, Good concentration; Motor Strength 5/5 B/L upper and lower extremities; DTRs 2+ intact and symmetric  CHEST/LUNG: Clear to percussion bilaterally; No rales, rhonchi, wheezing, or rubs  HEART: Regular rate and rhythm; No murmurs, rubs, or gallops  ABDOMEN: Soft, Nontender, Nondistended; Bowel sounds present  EXTREMITIES:  2+ Peripheral Pulses, No clubbing, cyanosis, or edema  LYMPH: No lymphadenopathy noted  SKIN: No rashes or lesions    Labs                          9.3    8.38  )-----------( 427      ( 2024 06:50 )             27.7     11-01    133[L]  |  99  |  11  ----------------------------<  98  3.6   |  26  |  0.54    Ca    9.0      2024 06:50            Urinalysis Basic - ( 2024 06:50 )    Color: x / Appearance: x / SG: x / pH: x  Gluc: 98 mg/dL / Ketone: x  / Bili: x / Urobili: x   Blood: x / Protein: x / Nitrite: x   Leuk Esterase: x / RBC: x / WBC x   Sq Epi: x / Non Sq Epi: x / Bacteria: x                  DVT prophylaxis: > Lovenox 40mg SQ daily  > Heparin   > SCD's

## 2024-11-03 NOTE — PROGRESS NOTE ADULT - NUTRITIONAL ASSESSMENT
This patient has been assessed with a concern for Malnutrition and has been determined to have a diagnosis/diagnoses of Moderate protein-calorie malnutrition.    This patient is being managed with:   Diet Easy to Chew-  Low Sodium  Supplement Feeding Modality:  Oral  Ensure Plus High Protein Cans or Servings Per Day:  1       Frequency:  Two Times a day  Entered: Oct 21 2024 12:09PM  
This patient has been assessed with a concern for Malnutrition and has been determined to have a diagnosis/diagnoses of Moderate protein-calorie malnutrition.    This patient is being managed with:   Diet Regular-  DASH/TLC {Sodium & Cholesterol Restricted}  Entered: Oct 19 2024  2:12PM    The following pending diet order is being considered for treatment of Moderate protein-calorie malnutrition:  Diet Easy to Chew-  Low Sodium  Supplement Feeding Modality:  Oral  Ensure Plus High Protein Cans or Servings Per Day:  1       Frequency:  Two Times a day  Entered: Oct 21 2024 12:09PM  
This patient has been assessed with a concern for Malnutrition and has been determined to have a diagnosis/diagnoses of Moderate protein-calorie malnutrition.    This patient is being managed with:   Diet Easy to Chew-  Low Sodium  Supplement Feeding Modality:  Oral  Ensure Plus High Protein Cans or Servings Per Day:  1       Frequency:  Two Times a day  Entered: Oct 21 2024 12:09PM  

## 2024-11-03 NOTE — PROGRESS NOTE ADULT - PROVIDER SPECIALTY LIST ADULT
Hospitalist
Orthopedics
Hospitalist
Hospitalist
Orthopedics
Hospitalist

## 2024-11-04 ENCOUNTER — TRANSCRIPTION ENCOUNTER (OUTPATIENT)
Age: 64
End: 2024-11-04

## 2024-11-04 VITALS
RESPIRATION RATE: 18 BRPM | DIASTOLIC BLOOD PRESSURE: 78 MMHG | TEMPERATURE: 98 F | SYSTOLIC BLOOD PRESSURE: 135 MMHG | OXYGEN SATURATION: 99 % | HEART RATE: 67 BPM

## 2024-11-04 PROCEDURE — 99239 HOSP IP/OBS DSCHRG MGMT >30: CPT

## 2024-11-04 RX ADMIN — FOLIC ACID 1 MILLIGRAM(S): 1 TABLET ORAL at 14:39

## 2024-11-04 RX ADMIN — Medication 20 MILLIGRAM(S): at 05:17

## 2024-11-04 RX ADMIN — Medication 325 MILLIGRAM(S): at 14:41

## 2024-11-04 RX ADMIN — HYDRALAZINE HYDROCHLORIDE 100 MILLIGRAM(S): 50 TABLET, FILM COATED ORAL at 14:41

## 2024-11-04 RX ADMIN — Medication 975 MILLIGRAM(S): at 05:21

## 2024-11-04 RX ADMIN — Medication 975 MILLIGRAM(S): at 06:21

## 2024-11-04 RX ADMIN — Medication 60 MILLIGRAM(S): at 05:17

## 2024-11-04 RX ADMIN — DIVALPROEX SODIUM 1000 MILLIGRAM(S): 250 TABLET, FILM COATED, EXTENDED RELEASE ORAL at 14:37

## 2024-11-04 RX ADMIN — Medication 1 DROP(S): at 05:18

## 2024-11-04 RX ADMIN — Medication 1 LOZENGE: at 05:17

## 2024-11-04 RX ADMIN — HYDRALAZINE HYDROCHLORIDE 100 MILLIGRAM(S): 50 TABLET, FILM COATED ORAL at 05:17

## 2024-11-04 RX ADMIN — FINASTERIDE 5 MILLIGRAM(S): 5 TABLET, FILM COATED ORAL at 14:39

## 2024-11-04 RX ADMIN — Medication 300 MILLIGRAM(S): at 05:17

## 2024-11-04 RX ADMIN — QUETIAPINE FUMARATE 200 MILLIGRAM(S): 200 TABLET ORAL at 05:17

## 2024-11-04 RX ADMIN — Medication 1 DROP(S): at 14:36

## 2024-11-04 RX ADMIN — Medication 2 MILLIGRAM(S): at 05:18

## 2024-11-04 RX ADMIN — Medication 81 MILLIGRAM(S): at 14:40

## 2024-11-04 NOTE — DISCHARGE NOTE NURSING/CASE MANAGEMENT/SOCIAL WORK - NSDCPEFALRISK_GEN_ALL_CORE
For information on Fall & Injury Prevention, visit: [email or web address] OR  [email or web address] OR  https://www.cdc.gov/steadi/patient.html

## 2024-11-04 NOTE — DISCHARGE NOTE NURSING/CASE MANAGEMENT/SOCIAL WORK - PATIENT PORTAL LINK FT
You can access the FollowMyHealth Patient Portal offered by [facility] by registering at the following website: [email or web address]. By joining [facility]’s FollowMyHealth portal, you will also be able to view your health information using other applications (apps) compatible with our system.

## 2024-11-04 NOTE — DISCHARGE NOTE NURSING/CASE MANAGEMENT/SOCIAL WORK - FINANCIAL ASSISTANCE
[facility] provides services at a reduced cost to those who are determined to be eligible through [facility]’s financial assistance program. Information regarding [facility]’s financial assistance program can be found by going to [email or web address] or by calling [phone].

## 2024-11-07 DIAGNOSIS — E87.6 HYPOKALEMIA: ICD-10-CM
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
## 2024-11-07 DIAGNOSIS — Z88.8 ALLERGY STATUS TO OTHER DRUGS, MEDICAMENTS AND BIOLOGICAL SUBSTANCES: ICD-10-CM

## 2024-11-07 DIAGNOSIS — N40.0 BENIGN PROSTATIC HYPERPLASIA WITHOUT LOWER URINARY TRACT SYMPTOMS: ICD-10-CM

## 2024-11-07 DIAGNOSIS — I10 ESSENTIAL (PRIMARY) HYPERTENSION: ICD-10-CM
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
## 2024-11-07 DIAGNOSIS — S72.141A DISPLACED INTERTROCHANTERIC FRACTURE OF RIGHT FEMUR, INITIAL ENCOUNTER FOR CLOSED FRACTURE: ICD-10-CM

## 2024-11-07 DIAGNOSIS — E83.42 HYPOMAGNESEMIA: ICD-10-CM

## 2024-11-07 DIAGNOSIS — F84.0 AUTISTIC DISORDER: ICD-10-CM

## 2024-11-07 DIAGNOSIS — E87.1 HYPO-OSMOLALITY AND HYPONATREMIA: ICD-10-CM

## 2024-11-07 DIAGNOSIS — E78.5 HYPERLIPIDEMIA, UNSPECIFIED: ICD-10-CM

## 2024-11-07 DIAGNOSIS — D56.3 THALASSEMIA MINOR: ICD-10-CM

## 2024-11-07 DIAGNOSIS — E44.0 MODERATE PROTEIN-CALORIE MALNUTRITION: ICD-10-CM

## 2024-11-07 DIAGNOSIS — Z79.82 LONG TERM (CURRENT) USE OF ASPIRIN: ICD-10-CM

## 2024-12-08 NOTE — PRE-OP CHECKLIST - ALLERGIES REVIEWED
ASSESSMENT:  Left big toe pain/swelling --likely gout      PLAN:  --discussed current pain and diagnosis. We discussed differential diagnosis of joint swelling including gout, cellulitis, arthritis process, septic joint, injury etc.  Questions addressed.    --recommend elevating area of pain/ swelling.    --Use prednisone as prescribed. Side effects of medication discussed.      --Expect improvement of this swelling/ pain/ any redness within 1-2 days of the above medication.  If not improving then the diagnosis should be questioned and patient should return to [facility] or see his doctor for further workup -- as he may need to be ruled out for cellulitis, possible injury etc.     --Also be aware-- if onset of fevers, more swelling, more redness or increasing pain are all signs of worsening. Patient should get a recheck immediately with [facility], primary care doctor or go to ER.   done

## 2024-12-24 PROBLEM — F10.90 ALCOHOL USE: Status: ACTIVE | Noted: 2019-03-15

## 2025-01-29 ENCOUNTER — NON-APPOINTMENT (OUTPATIENT)
Age: 65
End: 2025-01-29

## (undated) DEVICE — GLV 6 PROTEXIS (BLUE)

## (undated) DEVICE — DRAPE SURGICAL #1010

## (undated) DEVICE — ELCTR BOVIE TIP NEEDLE INSULATED 2.8" EDGE

## (undated) DEVICE — ELCTR BOVIE PENCIL HANDPIECE

## (undated) DEVICE — SOL IRR BAG NS 0.9% 3000ML

## (undated) DEVICE — MEDICATION LABELS W MARKER

## (undated) DEVICE — DRSG COBAN 6"

## (undated) DEVICE — SPONGE END-STRUNG CYLINDRICAL .5X1.5"

## (undated) DEVICE — SOL IRR POUR H2O 250ML

## (undated) DEVICE — DRAPE 3/4 SHEET W REINFORCEMENT 56X77"

## (undated) DEVICE — SUT CHROMIC 3-0 30" C-13

## (undated) DEVICE — DRILL BIT STRYKER ORTHO LOKG 4.2X360MM

## (undated) DEVICE — DRAPE INSTRUMENT POUCH 6.75" X 11"

## (undated) DEVICE — PACK DENTAL

## (undated) DEVICE — WARMING BLANKET LOWER ADULT

## (undated) DEVICE — DRAPE HIP W POUCHES 87X115X134"

## (undated) DEVICE — SUT ETHIBOND 5 30" V-40

## (undated) DEVICE — SAW BLADE LINVATEC HALL 25.4X90X0.9MM

## (undated) DEVICE — DRAPE STERI-DRAPE INCISE 32X33"

## (undated) DEVICE — SUT RETRIEVER S&N LAVENDER

## (undated) DEVICE — SUCTION YANKAUER NO CONTROL VENT

## (undated) DEVICE — DRAPE SPLIT SHEET 77" X 120"

## (undated) DEVICE — HOOD FLYTE STRYKER HELMET SHIELD

## (undated) DEVICE — DRAPE MAGNETIC INSTRUMENT MEDIUM

## (undated) DEVICE — DRSG XEROFORM 5 X 9"

## (undated) DEVICE — POSITIONER FOAM EGG CRATE ULNAR 2PCS (PINK)

## (undated) DEVICE — VENODYNE/SCD SLEEVE CALF MEDIUM

## (undated) DEVICE — DRAPE TOWEL BLUE 17" X 24"

## (undated) DEVICE — SOL IRR POUR NS 0.9% 500ML